# Patient Record
Sex: FEMALE | Race: BLACK OR AFRICAN AMERICAN | Employment: OTHER | URBAN - METROPOLITAN AREA
[De-identification: names, ages, dates, MRNs, and addresses within clinical notes are randomized per-mention and may not be internally consistent; named-entity substitution may affect disease eponyms.]

---

## 2020-03-21 ENCOUNTER — HOSPITAL ENCOUNTER (EMERGENCY)
Age: 80
Discharge: ANOTHER HOSPITAL | End: 2020-03-21
Attending: EMERGENCY MEDICINE
Payer: MEDICARE

## 2020-03-21 ENCOUNTER — APPOINTMENT (OUTPATIENT)
Dept: CT IMAGING | Age: 80
End: 2020-03-21
Payer: MEDICARE

## 2020-03-21 ENCOUNTER — APPOINTMENT (OUTPATIENT)
Dept: GENERAL RADIOLOGY | Age: 80
End: 2020-03-21
Payer: MEDICARE

## 2020-03-21 VITALS
DIASTOLIC BLOOD PRESSURE: 60 MMHG | WEIGHT: 178.8 LBS | SYSTOLIC BLOOD PRESSURE: 128 MMHG | HEART RATE: 120 BPM | OXYGEN SATURATION: 96 % | TEMPERATURE: 98.9 F | HEIGHT: 66 IN | BODY MASS INDEX: 28.73 KG/M2 | RESPIRATION RATE: 18 BRPM

## 2020-03-21 LAB
ANION GAP SERPL CALCULATED.3IONS-SCNC: 12 MMOL/L (ref 3–16)
BACTERIA: ABNORMAL /HPF
BASE EXCESS VENOUS: 5.1 MMOL/L (ref -2–3)
BASOPHILS ABSOLUTE: 0.1 K/UL (ref 0–0.2)
BASOPHILS RELATIVE PERCENT: 0.3 %
BILIRUBIN URINE: ABNORMAL
BLOOD, URINE: ABNORMAL
BUN BLDV-MCNC: 9 MG/DL (ref 7–20)
CALCIUM SERPL-MCNC: 9.9 MG/DL (ref 8.3–10.6)
CARBOXYHEMOGLOBIN: 2.6 % (ref 0–1.5)
CHLORIDE BLD-SCNC: 95 MMOL/L (ref 99–110)
CLARITY: CLEAR
CO2: 27 MMOL/L (ref 21–32)
COLOR: YELLOW
CREAT SERPL-MCNC: 1.2 MG/DL (ref 0.6–1.2)
EOSINOPHILS ABSOLUTE: 0.3 K/UL (ref 0–0.6)
EOSINOPHILS RELATIVE PERCENT: 1.1 %
EPITHELIAL CELLS, UA: ABNORMAL /HPF (ref 0–5)
GFR AFRICAN AMERICAN: 52
GFR NON-AFRICAN AMERICAN: 43
GLUCOSE BLD-MCNC: 232 MG/DL (ref 70–99)
GLUCOSE URINE: NEGATIVE MG/DL
HCO3 VENOUS: 29.6 MMOL/L (ref 24–28)
HCT VFR BLD CALC: 25.6 % (ref 36–48)
HEMOGLOBIN, VEN, REDUCED: 40.9 %
HEMOGLOBIN: 7.9 G/DL (ref 12–16)
KETONES, URINE: ABNORMAL MG/DL
LACTIC ACID: 1.2 MMOL/L (ref 0.4–2)
LEUKOCYTE ESTERASE, URINE: ABNORMAL
LYMPHOCYTES ABSOLUTE: 1.6 K/UL (ref 1–5.1)
LYMPHOCYTES RELATIVE PERCENT: 6.4 %
MCH RBC QN AUTO: 28.2 PG (ref 26–34)
MCHC RBC AUTO-ENTMCNC: 30.7 G/DL (ref 31–36)
MCV RBC AUTO: 92 FL (ref 80–100)
METHEMOGLOBIN VENOUS: 0.7 % (ref 0–1.5)
MICROSCOPIC EXAMINATION: YES
MONOCYTES ABSOLUTE: 1.3 K/UL (ref 0–1.3)
MONOCYTES RELATIVE PERCENT: 5.4 %
NEUTROPHILS ABSOLUTE: 21.1 K/UL (ref 1.7–7.7)
NEUTROPHILS RELATIVE PERCENT: 86.8 %
NITRITE, URINE: NEGATIVE
O2 SAT, VEN: 58 %
PCO2, VEN: 46.2 MMHG (ref 41–51)
PDW BLD-RTO: 21.1 % (ref 12.4–15.4)
PH UA: 6 (ref 5–8)
PH VENOUS: 7.42 (ref 7.35–7.45)
PLATELET # BLD: 73 K/UL (ref 135–450)
PLATELET SLIDE REVIEW: ADEQUATE
PMV BLD AUTO: 8.6 FL (ref 5–10.5)
PO2, VEN: 34.1 MMHG (ref 25–40)
POTASSIUM REFLEX MAGNESIUM: 4.1 MMOL/L (ref 3.5–5.1)
PRO-BNP: 3578 PG/ML (ref 0–449)
PROTEIN UA: >=300 MG/DL
RBC # BLD: 2.79 M/UL (ref 4–5.2)
RBC UA: ABNORMAL /HPF (ref 0–4)
SODIUM BLD-SCNC: 134 MMOL/L (ref 136–145)
SPECIFIC GRAVITY UA: 1.02 (ref 1–1.03)
TCO2 CALC VENOUS: 31 MMOL/L
TROPONIN: 0.46 NG/ML
TROPONIN: 0.51 NG/ML
URINE TYPE: ABNORMAL
UROBILINOGEN, URINE: 0.2 E.U./DL
WBC # BLD: 24.3 K/UL (ref 4–11)
WBC UA: ABNORMAL /HPF (ref 0–5)

## 2020-03-21 PROCEDURE — 96365 THER/PROPH/DIAG IV INF INIT: CPT

## 2020-03-21 PROCEDURE — 71250 CT THORAX DX C-: CPT

## 2020-03-21 PROCEDURE — 81001 URINALYSIS AUTO W/SCOPE: CPT

## 2020-03-21 PROCEDURE — 93005 ELECTROCARDIOGRAM TRACING: CPT | Performed by: EMERGENCY MEDICINE

## 2020-03-21 PROCEDURE — 99285 EMERGENCY DEPT VISIT HI MDM: CPT

## 2020-03-21 PROCEDURE — 80048 BASIC METABOLIC PNL TOTAL CA: CPT

## 2020-03-21 PROCEDURE — 85025 COMPLETE CBC W/AUTO DIFF WBC: CPT

## 2020-03-21 PROCEDURE — 6370000000 HC RX 637 (ALT 250 FOR IP): Performed by: PHYSICIAN ASSISTANT

## 2020-03-21 PROCEDURE — 83605 ASSAY OF LACTIC ACID: CPT

## 2020-03-21 PROCEDURE — 82803 BLOOD GASES ANY COMBINATION: CPT

## 2020-03-21 PROCEDURE — 2580000003 HC RX 258: Performed by: PHYSICIAN ASSISTANT

## 2020-03-21 PROCEDURE — 71045 X-RAY EXAM CHEST 1 VIEW: CPT

## 2020-03-21 PROCEDURE — 94761 N-INVAS EAR/PLS OXIMETRY MLT: CPT

## 2020-03-21 PROCEDURE — 6360000002 HC RX W HCPCS: Performed by: PHYSICIAN ASSISTANT

## 2020-03-21 PROCEDURE — 87040 BLOOD CULTURE FOR BACTERIA: CPT

## 2020-03-21 PROCEDURE — 84484 ASSAY OF TROPONIN QUANT: CPT

## 2020-03-21 PROCEDURE — 83880 ASSAY OF NATRIURETIC PEPTIDE: CPT

## 2020-03-21 PROCEDURE — 94664 DEMO&/EVAL PT USE INHALER: CPT

## 2020-03-21 PROCEDURE — 94640 AIRWAY INHALATION TREATMENT: CPT

## 2020-03-21 RX ORDER — SENNA AND DOCUSATE SODIUM 50; 8.6 MG/1; MG/1
2 TABLET, FILM COATED ORAL 2 TIMES DAILY PRN
COMMUNITY
End: 2020-03-26 | Stop reason: CLARIF

## 2020-03-21 RX ORDER — AMLODIPINE BESYLATE 5 MG/1
5 TABLET ORAL DAILY
Status: ON HOLD | COMMUNITY
End: 2020-04-03

## 2020-03-21 RX ORDER — 0.9 % SODIUM CHLORIDE 0.9 %
500 INTRAVENOUS SOLUTION INTRAVENOUS ONCE
Status: COMPLETED | OUTPATIENT
Start: 2020-03-21 | End: 2020-03-21

## 2020-03-21 RX ORDER — POLYETHYLENE GLYCOL 3350 17 G/17G
17 POWDER, FOR SOLUTION ORAL NIGHTLY
Status: ON HOLD | COMMUNITY
End: 2020-04-03

## 2020-03-21 RX ORDER — ESOMEPRAZOLE MAGNESIUM 20 MG/1
20 FOR SUSPENSION ORAL DAILY
COMMUNITY
End: 2020-03-26 | Stop reason: CLARIF

## 2020-03-21 RX ORDER — NYSTATIN 100000 [USP'U]/G
POWDER TOPICAL 2 TIMES DAILY
Status: ON HOLD | COMMUNITY
End: 2020-04-03

## 2020-03-21 RX ORDER — 0.9 % SODIUM CHLORIDE 0.9 %
500 INTRAVENOUS SOLUTION INTRAVENOUS ONCE
Status: DISCONTINUED | OUTPATIENT
Start: 2020-03-21 | End: 2020-03-21

## 2020-03-21 RX ORDER — ATORVASTATIN CALCIUM 80 MG/1
80 TABLET, FILM COATED ORAL NIGHTLY
Status: ON HOLD | COMMUNITY
End: 2020-04-03

## 2020-03-21 RX ORDER — LINEZOLID 2 MG/ML
600 INJECTION, SOLUTION INTRAVENOUS ONCE
Status: COMPLETED | OUTPATIENT
Start: 2020-03-21 | End: 2020-03-21

## 2020-03-21 RX ORDER — IPRATROPIUM BROMIDE AND ALBUTEROL SULFATE 2.5; .5 MG/3ML; MG/3ML
1 SOLUTION RESPIRATORY (INHALATION) ONCE
Status: COMPLETED | OUTPATIENT
Start: 2020-03-21 | End: 2020-03-21

## 2020-03-21 RX ADMIN — LINEZOLID 600 MG: 600 INJECTION, SOLUTION INTRAVENOUS at 15:37

## 2020-03-21 RX ADMIN — MEROPENEM 1 G: 1 INJECTION, POWDER, FOR SOLUTION INTRAVENOUS at 15:03

## 2020-03-21 RX ADMIN — IPRATROPIUM BROMIDE AND ALBUTEROL SULFATE 1 AMPULE: .5; 3 SOLUTION RESPIRATORY (INHALATION) at 13:27

## 2020-03-21 RX ADMIN — SODIUM CHLORIDE 500 ML: 9 INJECTION, SOLUTION INTRAVENOUS at 15:04

## 2020-03-21 ASSESSMENT — ENCOUNTER SYMPTOMS
EYE REDNESS: 0
COUGH: 1
DIARRHEA: 0
NAUSEA: 0
ABDOMINAL PAIN: 0
VOMITING: 0
SHORTNESS OF BREATH: 1

## 2020-03-21 NOTE — ED NOTES
Bed: A09-09  Expected date:   Expected time:   Means of arrival:   Comments:  Sulma Medrano RN  03/21/20 2104

## 2020-03-21 NOTE — ED NOTES
Spoke with Bakari Reed at this time. POA wants patient transferred to [facility] if she is to be admitted.  Will notify Nabil Palmer RN  03/21/20 3671

## 2020-03-21 NOTE — ED PROVIDER NOTES
(CHOLECALCIFEROL) 1000 UNIT TABS TABLET      Take 2,000 Units by mouth daily        Allergies     She is allergic to dye [iodides]; shellfish-derived products; and sulfa antibiotics. Physical Exam     INITIAL VITALS: BP: (!) 146/80,Temp: 98.9 °F (37.2 °C), Pulse: 127, Resp: (!) 32, SpO2: 100 %   Physical Exam  Vitals signs and nursing note reviewed. Constitutional:       General: She is not in acute distress. HENT:      Head: Normocephalic and atraumatic. Nose: Nose normal.      Mouth/Throat:      Mouth: Mucous membranes are moist.      Pharynx: Oropharynx is clear. Eyes:      Extraocular Movements: Extraocular movements intact. Conjunctiva/sclera: Conjunctivae normal.   Neck:      Musculoskeletal: Neck supple. Cardiovascular:      Rate and Rhythm: Normal rate and regular rhythm. Pulmonary:      Effort: Pulmonary effort is normal. No respiratory distress. Breath sounds: Normal breath sounds. No wheezing or rhonchi. Comments: Diminished breath sounds throughout  Abdominal:      General: Bowel sounds are normal. There is no distension. Palpations: Abdomen is soft. Tenderness: There is no abdominal tenderness. There is no guarding or rebound. Musculoskeletal:      Right lower leg: No edema. Left lower leg: No edema. Skin:     General: Skin is warm and dry. Neurological:      Mental Status: She is alert and oriented to person, place, and time.    Psychiatric:         Mood and Affect: Mood normal.         Behavior: Behavior normal.         Diagnostic Results     EKG   Interpreted in conjunction with emergencydepartment physician Kevin Hood MD  Rhythm: sinus tachycardia  Rate: tachycardia and 120-130  Axis: left  Ectopy: none  Conduction: nonspecific interventricular conduction block  ST Segments: nonspecific changes  T Waves:non specific changes  Q Waves: none  Clinical Impression: non-specific EKG  Comparison:  5/20/2009    RADIOLOGY:  CT CHEST ABDOMEN PELVIS WO w/ Reflex to MG   Result Value Ref Range    Sodium 134 (L) 136 - 145 mmol/L    Potassium reflex Magnesium 4.1 3.5 - 5.1 mmol/L    Chloride 95 (L) 99 - 110 mmol/L    CO2 27 21 - 32 mmol/L    Anion Gap 12 3 - 16    Glucose 232 (H) 70 - 99 mg/dL    BUN 9 7 - 20 mg/dL    CREATININE 1.2 0.6 - 1.2 mg/dL    GFR Non- 43 (A) >60    GFR  52 (A) >60    Calcium 9.9 8.3 - 10.6 mg/dL   Blood gas, venous (Lab)   Result Value Ref Range    pH, [place] 7.422 7.350 - 7.450    pCO2, [place] 46.2 41.0 - 51.0 mmHg    pO2, [place] 34.1 25.0 - 40.0 mmHg    HCO3, Venous 29.6 (H) 24.0 - 28.0 mmol/L    Base Excess, [place] 5.1 (H) -2.0 - 3.0 mmol/L    O2 Sat, [place] 58 Not established %    Carboxyhemoglobin 2.6 (H) 0.0 - 1.5 %    MetHgb, [place] 0.7 0.0 - 1.5 %    TC02 (Calc), [place] 31 mmol/L    Hemoglobin, [place], Reduced 40.90 %   Lactate, plasma   Result Value Ref Range    Lactic Acid 1.2 0.4 - 2.0 mmol/L       RECENT VITALS:  BP: (!) 116/53, Temp: 98.9 °F (37.2 °C), Pulse: 124,Resp: 24, SpO2: 94 %     ED Course     Nursing Notes, Past Medical Hx, Past Surgical Hx, Social Hx, Allergies, and Family Hx were reviewed. The patient was given the followingmedications:  Orders Placed This Encounter   Medications    ipratropium-albuterol (DUONEB) nebulizer solution 1 ampule    DISCONTD: 0.9 % sodium chloride bolus    meropenem (MERREM) 1 g in sodium chloride 0.9 % 100 mL IVPB (mini-bag)    0.9 % sodium chloride bolus    linezolid (ZYVOX) IVPB 600 mg       CONSULTS:  None    MEDICAL DECISION MAKING / ASSESSMENT / Katyethan Harris is a 78 y.o. female  with PMH of Diabetes, HTN, HLD, COPD/Asthma, CHF with EF of 60% in 2017, Aortic Stenosis s/p TAVR repair, ESRD on HD T/Th/Sat who presents with Shortness of Breath that began during dialysis this morning. Associated with wheezing. Patient also endorses a cough x 1 month. Denies any fever or chest pain, GI symptoms.  Chart review reveals that patient was admitted to [facility] discussed with patient's next of kin and power of  who requests that patient be [facility] to [facility].  Transfer was initiated and accepted by Dr. Marina Edouard. Patient will continue to be monitored until she is transferred to her new treatment location. This patient was also evaluated by the attending physician. All care plans were discussed and agreed upon. Clinical Impression     1.  Sepsis, due to unspecified organism, unspecified whether acute organ dysfunction present [facility])        Disposition      DISPOSITION  Transfer to [place], [place]  03/21/20 4059

## 2020-03-22 LAB
EKG ATRIAL RATE: 124 BPM
EKG DIAGNOSIS: NORMAL
EKG P AXIS: 63 DEGREES
EKG P-R INTERVAL: 150 MS
EKG Q-T INTERVAL: 344 MS
EKG QRS DURATION: 134 MS
EKG QTC CALCULATION (BAZETT): 494 MS
EKG R AXIS: 10 DEGREES
EKG T AXIS: 117 DEGREES
EKG VENTRICULAR RATE: 124 BPM

## 2020-03-25 LAB
BLOOD CULTURE, ROUTINE: NORMAL
CULTURE, BLOOD 2: NORMAL

## 2020-03-26 ENCOUNTER — APPOINTMENT (OUTPATIENT)
Dept: GENERAL RADIOLOGY | Age: 80
DRG: 871 | End: 2020-03-26
Payer: MEDICARE

## 2020-03-26 ENCOUNTER — APPOINTMENT (OUTPATIENT)
Dept: CT IMAGING | Age: 80
DRG: 871 | End: 2020-03-26
Payer: MEDICARE

## 2020-03-26 ENCOUNTER — HOSPITAL ENCOUNTER (INPATIENT)
Age: 80
LOS: 8 days | Discharge: HOSPICE | DRG: 871 | End: 2020-04-03
Attending: EMERGENCY MEDICINE | Admitting: INTERNAL MEDICINE
Payer: MEDICARE

## 2020-03-26 PROBLEM — A41.9 SEPSIS (HCC): Status: ACTIVE | Noted: 2020-03-26

## 2020-03-26 LAB
ABO/RH: NORMAL
ALBUMIN SERPL-MCNC: 3.5 G/DL (ref 3.4–5)
ALP BLD-CCNC: 92 U/L (ref 40–129)
ALT SERPL-CCNC: 8 U/L (ref 10–40)
ANION GAP SERPL CALCULATED.3IONS-SCNC: 17 MMOL/L (ref 3–16)
ANION GAP SERPL CALCULATED.3IONS-SCNC: 21 MMOL/L (ref 3–16)
ANISOCYTOSIS: ABNORMAL
ANTIBODY SCREEN: NORMAL
AST SERPL-CCNC: 20 U/L (ref 15–37)
BASE EXCESS ARTERIAL: 6 (ref -3–3)
BASE EXCESS VENOUS: 2.2 MMOL/L (ref -2–3)
BASOPHILS ABSOLUTE: 0.1 K/UL (ref 0–0.2)
BASOPHILS ABSOLUTE: 0.1 K/UL (ref 0–0.2)
BASOPHILS RELATIVE PERCENT: 0.6 %
BASOPHILS RELATIVE PERCENT: 0.6 %
BILIRUB SERPL-MCNC: 0.4 MG/DL (ref 0–1)
BILIRUBIN DIRECT: <0.2 MG/DL (ref 0–0.3)
BILIRUBIN, INDIRECT: ABNORMAL MG/DL (ref 0–1)
BUN BLDV-MCNC: 15 MG/DL (ref 7–20)
BUN BLDV-MCNC: 16 MG/DL (ref 7–20)
C-REACTIVE PROTEIN: 31.4 MG/L (ref 0–5.1)
CALCIUM IONIZED: 1.38 MMOL/L (ref 1.12–1.32)
CALCIUM SERPL-MCNC: 10.3 MG/DL (ref 8.3–10.6)
CALCIUM SERPL-MCNC: 10.4 MG/DL (ref 8.3–10.6)
CARBOXYHEMOGLOBIN: 2.8 % (ref 0–1.5)
CHLORIDE BLD-SCNC: 92 MMOL/L (ref 99–110)
CHLORIDE BLD-SCNC: 93 MMOL/L (ref 99–110)
CO2: 22 MMOL/L (ref 21–32)
CO2: 25 MMOL/L (ref 21–32)
CREAT SERPL-MCNC: 1.5 MG/DL (ref 0.6–1.2)
CREAT SERPL-MCNC: 1.6 MG/DL (ref 0.6–1.2)
EKG ATRIAL RATE: 140 BPM
EKG ATRIAL RATE: 154 BPM
EKG DIAGNOSIS: NORMAL
EKG DIAGNOSIS: NORMAL
EKG P AXIS: 66 DEGREES
EKG P-R INTERVAL: 148 MS
EKG Q-T INTERVAL: 262 MS
EKG Q-T INTERVAL: 386 MS
EKG QRS DURATION: 122 MS
EKG QRS DURATION: 128 MS
EKG QTC CALCULATION (BAZETT): 419 MS
EKG QTC CALCULATION (BAZETT): 589 MS
EKG R AXIS: 11 DEGREES
EKG R AXIS: 20 DEGREES
EKG T AXIS: 100 DEGREES
EKG T AXIS: 139 DEGREES
EKG VENTRICULAR RATE: 140 BPM
EKG VENTRICULAR RATE: 154 BPM
EOSINOPHILS ABSOLUTE: 0.3 K/UL (ref 0–0.6)
EOSINOPHILS ABSOLUTE: 0.5 K/UL (ref 0–0.6)
EOSINOPHILS RELATIVE PERCENT: 1.3 %
EOSINOPHILS RELATIVE PERCENT: 2.6 %
GFR AFRICAN AMERICAN: 37
GFR AFRICAN AMERICAN: 40
GFR NON-AFRICAN AMERICAN: 31
GFR NON-AFRICAN AMERICAN: 33
GLUCOSE BLD-MCNC: 171 MG/DL (ref 70–99)
GLUCOSE BLD-MCNC: 202 MG/DL (ref 70–99)
GLUCOSE BLD-MCNC: 219 MG/DL (ref 70–99)
GLUCOSE BLD-MCNC: 225 MG/DL (ref 70–99)
GLUCOSE BLD-MCNC: 244 MG/DL (ref 70–99)
GLUCOSE BLD-MCNC: 245 MG/DL (ref 70–99)
HCO3 ARTERIAL: 33 MMOL/L (ref 21–29)
HCO3 VENOUS: 29.6 MMOL/L (ref 24–28)
HCT VFR BLD CALC: 25.7 % (ref 36–48)
HCT VFR BLD CALC: 28.9 % (ref 36–48)
HEMOGLOBIN, VEN, REDUCED: 7.8 %
HEMOGLOBIN: 7.7 G/DL (ref 12–16)
HEMOGLOBIN: 8.6 G/DL (ref 12–16)
INR BLD: 1.07 (ref 0.86–1.14)
LACTATE: 2.05 MMOL/L (ref 0.4–2)
LACTIC ACID, SEPSIS: 0.9 MMOL/L (ref 0.4–1.9)
LACTIC ACID, SEPSIS: 1.5 MMOL/L (ref 0.4–1.9)
LACTIC ACID, SEPSIS: 2 MMOL/L (ref 0.4–1.9)
LIPASE: 35 U/L (ref 13–60)
LYMPHOCYTES ABSOLUTE: 2.9 K/UL (ref 1–5.1)
LYMPHOCYTES ABSOLUTE: 3.5 K/UL (ref 1–5.1)
LYMPHOCYTES RELATIVE PERCENT: 15.1 %
LYMPHOCYTES RELATIVE PERCENT: 16.7 %
MCH RBC QN AUTO: 27.7 PG (ref 26–34)
MCH RBC QN AUTO: 28 PG (ref 26–34)
MCHC RBC AUTO-ENTMCNC: 29.7 G/DL (ref 31–36)
MCHC RBC AUTO-ENTMCNC: 30.1 G/DL (ref 31–36)
MCV RBC AUTO: 93.1 FL (ref 80–100)
MCV RBC AUTO: 93.2 FL (ref 80–100)
METHEMOGLOBIN VENOUS: 0.2 % (ref 0–1.5)
MONOCYTES ABSOLUTE: 0.6 K/UL (ref 0–1.3)
MONOCYTES ABSOLUTE: 1.2 K/UL (ref 0–1.3)
MONOCYTES RELATIVE PERCENT: 2.8 %
MONOCYTES RELATIVE PERCENT: 6.2 %
NEUTROPHILS ABSOLUTE: 14.3 K/UL (ref 1.7–7.7)
NEUTROPHILS ABSOLUTE: 16.4 K/UL (ref 1.7–7.7)
NEUTROPHILS RELATIVE PERCENT: 75.5 %
NEUTROPHILS RELATIVE PERCENT: 78.6 %
O2 SAT, ARTERIAL: 96 % (ref 93–100)
O2 SAT, VEN: 92 %
PCO2 ARTERIAL: 74.4 MM HG (ref 35–45)
PCO2, VEN: 61.5 MMHG (ref 41–51)
PDW BLD-RTO: 21 % (ref 12.4–15.4)
PDW BLD-RTO: 22 % (ref 12.4–15.4)
PERFORMED ON: ABNORMAL
PH ARTERIAL: 7.25 (ref 7.35–7.45)
PH VENOUS: 7.3 (ref 7.35–7.45)
PLATELET # BLD: 30 K/UL (ref 135–450)
PLATELET # BLD: 43 K/UL (ref 135–450)
PLATELET SLIDE REVIEW: ABNORMAL
PMV BLD AUTO: 10 FL (ref 5–10.5)
PMV BLD AUTO: 9.9 FL (ref 5–10.5)
PO2 ARTERIAL: 98.9 MM HG (ref 75–108)
PO2, VEN: 73.8 MMHG (ref 25–40)
POC POTASSIUM: 4.6 MMOL/L (ref 3.5–5.1)
POC SAMPLE TYPE: ABNORMAL
POC SODIUM: 135 MMOL/L (ref 136–145)
POLYCHROMASIA: ABNORMAL
POTASSIUM REFLEX MAGNESIUM: 4.6 MMOL/L (ref 3.5–5.1)
POTASSIUM SERPL-SCNC: 4.7 MMOL/L (ref 3.5–5.1)
PRO-BNP: 4757 PG/ML (ref 0–449)
PROCALCITONIN: 1.31 NG/ML (ref 0–0.15)
PROTHROMBIN TIME: 12.4 SEC (ref 10–13.2)
RAPID INFLUENZA  B AGN: NEGATIVE
RAPID INFLUENZA A AGN: NEGATIVE
RBC # BLD: 2.76 M/UL (ref 4–5.2)
RBC # BLD: 3.11 M/UL (ref 4–5.2)
SCHISTOCYTES: ABNORMAL
SODIUM BLD-SCNC: 135 MMOL/L (ref 136–145)
SODIUM BLD-SCNC: 135 MMOL/L (ref 136–145)
TCO2 ARTERIAL: 35 MMOL/L
TCO2 CALC VENOUS: 32 MMOL/L
TEAR DROP CELLS: ABNORMAL
TOTAL PROTEIN: 7.1 G/DL (ref 6.4–8.2)
TROPONIN: 0.46 NG/ML
TROPONIN: 0.46 NG/ML
VANCOMYCIN RANDOM: 14 UG/ML
WBC # BLD: 18.9 K/UL (ref 4–11)
WBC # BLD: 20.8 K/UL (ref 4–11)

## 2020-03-26 PROCEDURE — 93005 ELECTROCARDIOGRAM TRACING: CPT | Performed by: EMERGENCY MEDICINE

## 2020-03-26 PROCEDURE — 85025 COMPLETE CBC W/AUTO DIFF WBC: CPT

## 2020-03-26 PROCEDURE — 6360000002 HC RX W HCPCS: Performed by: EMERGENCY MEDICINE

## 2020-03-26 PROCEDURE — 6360000002 HC RX W HCPCS: Performed by: STUDENT IN AN ORGANIZED HEALTH CARE EDUCATION/TRAINING PROGRAM

## 2020-03-26 PROCEDURE — 82330 ASSAY OF CALCIUM: CPT

## 2020-03-26 PROCEDURE — 2500000003 HC RX 250 WO HCPCS: Performed by: STUDENT IN AN ORGANIZED HEALTH CARE EDUCATION/TRAINING PROGRAM

## 2020-03-26 PROCEDURE — 5A1945Z RESPIRATORY VENTILATION, 24-96 CONSECUTIVE HOURS: ICD-10-PCS | Performed by: INTERNAL MEDICINE

## 2020-03-26 PROCEDURE — 84295 ASSAY OF SERUM SODIUM: CPT

## 2020-03-26 PROCEDURE — 86901 BLOOD TYPING SEROLOGIC RH(D): CPT

## 2020-03-26 PROCEDURE — 6370000000 HC RX 637 (ALT 250 FOR IP): Performed by: STUDENT IN AN ORGANIZED HEALTH CARE EDUCATION/TRAINING PROGRAM

## 2020-03-26 PROCEDURE — 2580000003 HC RX 258: Performed by: INTERNAL MEDICINE

## 2020-03-26 PROCEDURE — 86140 C-REACTIVE PROTEIN: CPT

## 2020-03-26 PROCEDURE — 80076 HEPATIC FUNCTION PANEL: CPT

## 2020-03-26 PROCEDURE — 94770 HC ETCO2 MONITOR DAILY: CPT

## 2020-03-26 PROCEDURE — 0BH17EZ INSERTION OF ENDOTRACHEAL AIRWAY INTO TRACHEA, VIA NATURAL OR ARTIFICIAL OPENING: ICD-10-PCS | Performed by: INTERNAL MEDICINE

## 2020-03-26 PROCEDURE — 2580000003 HC RX 258: Performed by: EMERGENCY MEDICINE

## 2020-03-26 PROCEDURE — 82947 ASSAY GLUCOSE BLOOD QUANT: CPT

## 2020-03-26 PROCEDURE — 84484 ASSAY OF TROPONIN QUANT: CPT

## 2020-03-26 PROCEDURE — 71250 CT THORAX DX C-: CPT

## 2020-03-26 PROCEDURE — 99291 CRITICAL CARE FIRST HOUR: CPT | Performed by: INTERNAL MEDICINE

## 2020-03-26 PROCEDURE — 83690 ASSAY OF LIPASE: CPT

## 2020-03-26 PROCEDURE — 86923 COMPATIBILITY TEST ELECTRIC: CPT

## 2020-03-26 PROCEDURE — 94761 N-INVAS EAR/PLS OXIMETRY MLT: CPT

## 2020-03-26 PROCEDURE — 2700000000 HC OXYGEN THERAPY PER DAY

## 2020-03-26 PROCEDURE — 96365 THER/PROPH/DIAG IV INF INIT: CPT

## 2020-03-26 PROCEDURE — 3E0G76Z INTRODUCTION OF NUTRITIONAL SUBSTANCE INTO UPPER GI, VIA NATURAL OR ARTIFICIAL OPENING: ICD-10-PCS | Performed by: INTERNAL MEDICINE

## 2020-03-26 PROCEDURE — 86900 BLOOD TYPING SEROLOGIC ABO: CPT

## 2020-03-26 PROCEDURE — 83605 ASSAY OF LACTIC ACID: CPT

## 2020-03-26 PROCEDURE — 71045 X-RAY EXAM CHEST 1 VIEW: CPT

## 2020-03-26 PROCEDURE — 2580000003 HC RX 258

## 2020-03-26 PROCEDURE — 99285 EMERGENCY DEPT VISIT HI MDM: CPT

## 2020-03-26 PROCEDURE — 82803 BLOOD GASES ANY COMBINATION: CPT

## 2020-03-26 PROCEDURE — 93005 ELECTROCARDIOGRAM TRACING: CPT | Performed by: STUDENT IN AN ORGANIZED HEALTH CARE EDUCATION/TRAINING PROGRAM

## 2020-03-26 PROCEDURE — 80202 ASSAY OF VANCOMYCIN: CPT

## 2020-03-26 PROCEDURE — 84132 ASSAY OF SERUM POTASSIUM: CPT

## 2020-03-26 PROCEDURE — 86850 RBC ANTIBODY SCREEN: CPT

## 2020-03-26 PROCEDURE — 84145 PROCALCITONIN (PCT): CPT

## 2020-03-26 PROCEDURE — 5A1D70Z PERFORMANCE OF URINARY FILTRATION, INTERMITTENT, LESS THAN 6 HOURS PER DAY: ICD-10-PCS | Performed by: INTERNAL MEDICINE

## 2020-03-26 PROCEDURE — 6360000002 HC RX W HCPCS: Performed by: INTERNAL MEDICINE

## 2020-03-26 PROCEDURE — 02HV33Z INSERTION OF INFUSION DEVICE INTO SUPERIOR VENA CAVA, PERCUTANEOUS APPROACH: ICD-10-PCS | Performed by: INTERNAL MEDICINE

## 2020-03-26 PROCEDURE — 30233N1 TRANSFUSION OF NONAUTOLOGOUS RED BLOOD CELLS INTO PERIPHERAL VEIN, PERCUTANEOUS APPROACH: ICD-10-PCS | Performed by: INTERNAL MEDICINE

## 2020-03-26 PROCEDURE — 85610 PROTHROMBIN TIME: CPT

## 2020-03-26 PROCEDURE — 80048 BASIC METABOLIC PNL TOTAL CA: CPT

## 2020-03-26 PROCEDURE — 83880 ASSAY OF NATRIURETIC PEPTIDE: CPT

## 2020-03-26 PROCEDURE — 87804 INFLUENZA ASSAY W/OPTIC: CPT

## 2020-03-26 PROCEDURE — 87040 BLOOD CULTURE FOR BACTERIA: CPT

## 2020-03-26 PROCEDURE — 90935 HEMODIALYSIS ONE EVALUATION: CPT

## 2020-03-26 PROCEDURE — P9016 RBC LEUKOCYTES REDUCED: HCPCS

## 2020-03-26 PROCEDURE — 2060000000 HC ICU INTERMEDIATE R&B

## 2020-03-26 PROCEDURE — 31500 INSERT EMERGENCY AIRWAY: CPT | Performed by: INTERNAL MEDICINE

## 2020-03-26 PROCEDURE — 94002 VENT MGMT INPAT INIT DAY: CPT

## 2020-03-26 PROCEDURE — 36415 COLL VENOUS BLD VENIPUNCTURE: CPT

## 2020-03-26 RX ORDER — PROPOFOL 10 MG/ML
10 INJECTION, EMULSION INTRAVENOUS
Status: DISCONTINUED | OUTPATIENT
Start: 2020-03-26 | End: 2020-03-30

## 2020-03-26 RX ORDER — POLYETHYLENE GLYCOL 3350 17 G/17G
17 POWDER, FOR SOLUTION ORAL NIGHTLY
Status: DISCONTINUED | OUTPATIENT
Start: 2020-03-26 | End: 2020-04-03 | Stop reason: HOSPADM

## 2020-03-26 RX ORDER — PROMETHAZINE HYDROCHLORIDE 25 MG/1
12.5 TABLET ORAL EVERY 6 HOURS PRN
Status: DISCONTINUED | OUTPATIENT
Start: 2020-03-26 | End: 2020-04-03 | Stop reason: HOSPADM

## 2020-03-26 RX ORDER — ALBUTEROL SULFATE 90 UG/1
2 AEROSOL, METERED RESPIRATORY (INHALATION) EVERY 6 HOURS PRN
Status: DISCONTINUED | OUTPATIENT
Start: 2020-03-26 | End: 2020-03-30 | Stop reason: CLARIF

## 2020-03-26 RX ORDER — IPRATROPIUM BROMIDE AND ALBUTEROL SULFATE 2.5; .5 MG/3ML; MG/3ML
1 SOLUTION RESPIRATORY (INHALATION) EVERY 4 HOURS PRN
Status: ON HOLD | COMMUNITY
End: 2020-04-03 | Stop reason: HOSPADM

## 2020-03-26 RX ORDER — HEPARIN SODIUM 5000 [USP'U]/ML
5000 INJECTION, SOLUTION INTRAVENOUS; SUBCUTANEOUS EVERY 8 HOURS SCHEDULED
Status: DISCONTINUED | OUTPATIENT
Start: 2020-03-26 | End: 2020-03-26

## 2020-03-26 RX ORDER — NICOTINE POLACRILEX 4 MG
15 LOZENGE BUCCAL PRN
Status: DISCONTINUED | OUTPATIENT
Start: 2020-03-26 | End: 2020-04-03 | Stop reason: HOSPADM

## 2020-03-26 RX ORDER — ATORVASTATIN CALCIUM 80 MG/1
80 TABLET, FILM COATED ORAL NIGHTLY
Status: DISCONTINUED | OUTPATIENT
Start: 2020-03-26 | End: 2020-04-03

## 2020-03-26 RX ORDER — ACETAMINOPHEN 325 MG/1
650 TABLET ORAL EVERY 6 HOURS PRN
Status: DISCONTINUED | OUTPATIENT
Start: 2020-03-26 | End: 2020-04-03 | Stop reason: HOSPADM

## 2020-03-26 RX ORDER — AMLODIPINE BESYLATE 5 MG/1
5 TABLET ORAL DAILY
Status: DISCONTINUED | OUTPATIENT
Start: 2020-03-26 | End: 2020-03-30

## 2020-03-26 RX ORDER — IPRATROPIUM BROMIDE AND ALBUTEROL SULFATE 2.5; .5 MG/3ML; MG/3ML
1 SOLUTION RESPIRATORY (INHALATION)
Status: DISCONTINUED | OUTPATIENT
Start: 2020-03-26 | End: 2020-03-26

## 2020-03-26 RX ORDER — PANTOPRAZOLE SODIUM 40 MG/1
40 TABLET, DELAYED RELEASE ORAL
Status: DISCONTINUED | OUTPATIENT
Start: 2020-03-27 | End: 2020-03-27

## 2020-03-26 RX ORDER — IPRATROPIUM BROMIDE AND ALBUTEROL SULFATE 2.5; .5 MG/3ML; MG/3ML
1 SOLUTION RESPIRATORY (INHALATION) EVERY 4 HOURS PRN
Status: DISCONTINUED | OUTPATIENT
Start: 2020-03-26 | End: 2020-03-26

## 2020-03-26 RX ORDER — SODIUM CHLORIDE 0.9 % (FLUSH) 0.9 %
10 SYRINGE (ML) INJECTION EVERY 12 HOURS SCHEDULED
Status: DISCONTINUED | OUTPATIENT
Start: 2020-03-26 | End: 2020-04-03 | Stop reason: HOSPADM

## 2020-03-26 RX ORDER — SODIUM CHLORIDE 0.9 % (FLUSH) 0.9 %
10 SYRINGE (ML) INJECTION PRN
Status: DISCONTINUED | OUTPATIENT
Start: 2020-03-26 | End: 2020-04-03 | Stop reason: HOSPADM

## 2020-03-26 RX ORDER — INSULIN GLARGINE 100 [IU]/ML
8 INJECTION, SOLUTION SUBCUTANEOUS NIGHTLY
Status: ON HOLD | COMMUNITY
End: 2020-04-03

## 2020-03-26 RX ORDER — CINACALCET 30 MG/1
30 TABLET, FILM COATED ORAL DAILY
Status: DISCONTINUED | OUTPATIENT
Start: 2020-03-26 | End: 2020-03-27

## 2020-03-26 RX ORDER — OMEPRAZOLE 20 MG/1
20 CAPSULE, DELAYED RELEASE ORAL DAILY
Status: ON HOLD | COMMUNITY
End: 2020-04-03

## 2020-03-26 RX ORDER — DEXTROSE MONOHYDRATE 50 MG/ML
100 INJECTION, SOLUTION INTRAVENOUS PRN
Status: DISCONTINUED | OUTPATIENT
Start: 2020-03-26 | End: 2020-04-03 | Stop reason: HOSPADM

## 2020-03-26 RX ORDER — DEXTROSE MONOHYDRATE 25 G/50ML
12.5 INJECTION, SOLUTION INTRAVENOUS PRN
Status: DISCONTINUED | OUTPATIENT
Start: 2020-03-26 | End: 2020-04-03 | Stop reason: HOSPADM

## 2020-03-26 RX ORDER — FERROUS SULFATE 325(65) MG
325 TABLET ORAL DAILY
Status: DISCONTINUED | OUTPATIENT
Start: 2020-03-26 | End: 2020-03-27

## 2020-03-26 RX ORDER — SODIUM CHLORIDE 9 MG/ML
INJECTION, SOLUTION INTRAVENOUS
Status: DISPENSED
Start: 2020-03-26 | End: 2020-03-27

## 2020-03-26 RX ORDER — SUCCINYLCHOLINE CHLORIDE 20 MG/ML
100 INJECTION INTRAMUSCULAR; INTRAVENOUS ONCE
Status: COMPLETED | OUTPATIENT
Start: 2020-03-26 | End: 2020-03-26

## 2020-03-26 RX ORDER — INSULIN LISPRO 100 [IU]/ML
0-12 INJECTION, SOLUTION INTRAVENOUS; SUBCUTANEOUS
Status: DISCONTINUED | OUTPATIENT
Start: 2020-03-26 | End: 2020-04-01

## 2020-03-26 RX ORDER — ETOMIDATE 2 MG/ML
0.3 INJECTION INTRAVENOUS ONCE
Status: COMPLETED | OUTPATIENT
Start: 2020-03-26 | End: 2020-03-26

## 2020-03-26 RX ORDER — HEPARIN SODIUM 1000 [USP'U]/ML
4500 INJECTION, SOLUTION INTRAVENOUS; SUBCUTANEOUS PRN
Status: DISCONTINUED | OUTPATIENT
Start: 2020-03-26 | End: 2020-04-03 | Stop reason: HOSPADM

## 2020-03-26 RX ORDER — CINACALCET 30 MG/1
30 TABLET, FILM COATED ORAL DAILY
Status: ON HOLD | COMMUNITY
End: 2020-04-03

## 2020-03-26 RX ORDER — ACETAMINOPHEN 650 MG/1
650 SUPPOSITORY RECTAL EVERY 6 HOURS PRN
Status: DISCONTINUED | OUTPATIENT
Start: 2020-03-26 | End: 2020-04-03 | Stop reason: HOSPADM

## 2020-03-26 RX ORDER — FERROUS SULFATE 325(65) MG
325 TABLET ORAL DAILY
Status: ON HOLD | COMMUNITY
End: 2020-04-03

## 2020-03-26 RX ORDER — ALBUTEROL SULFATE 2.5 MG/3ML
2.5 SOLUTION RESPIRATORY (INHALATION)
Status: DISCONTINUED | OUTPATIENT
Start: 2020-03-26 | End: 2020-03-26 | Stop reason: ALTCHOICE

## 2020-03-26 RX ORDER — ADENOSINE 3 MG/ML
6 INJECTION, SOLUTION INTRAVENOUS ONCE
Status: COMPLETED | OUTPATIENT
Start: 2020-03-26 | End: 2020-03-26

## 2020-03-26 RX ORDER — SODIUM CHLORIDE 9 MG/ML
INJECTION, SOLUTION INTRAVENOUS
Status: COMPLETED
Start: 2020-03-26 | End: 2020-03-26

## 2020-03-26 RX ORDER — 0.9 % SODIUM CHLORIDE 0.9 %
1000 INTRAVENOUS SOLUTION INTRAVENOUS ONCE
Status: COMPLETED | OUTPATIENT
Start: 2020-03-26 | End: 2020-03-26

## 2020-03-26 RX ORDER — ONDANSETRON 2 MG/ML
4 INJECTION INTRAMUSCULAR; INTRAVENOUS EVERY 6 HOURS PRN
Status: DISCONTINUED | OUTPATIENT
Start: 2020-03-26 | End: 2020-04-03 | Stop reason: HOSPADM

## 2020-03-26 RX ORDER — ALBUTEROL SULFATE 90 UG/1
2 AEROSOL, METERED RESPIRATORY (INHALATION)
Status: DISCONTINUED | OUTPATIENT
Start: 2020-03-26 | End: 2020-03-26

## 2020-03-26 RX ADMIN — PROPOFOL 10 MCG/KG/MIN: 10 INJECTION, EMULSION INTRAVENOUS at 19:13

## 2020-03-26 RX ADMIN — SODIUM CHLORIDE 1000 ML: 9 INJECTION, SOLUTION INTRAVENOUS at 10:30

## 2020-03-26 RX ADMIN — INSULIN LISPRO 2 UNITS: 100 INJECTION, SOLUTION INTRAVENOUS; SUBCUTANEOUS at 22:17

## 2020-03-26 RX ADMIN — HEPARIN SODIUM 5000 UNITS: 5000 INJECTION INTRAVENOUS; SUBCUTANEOUS at 21:32

## 2020-03-26 RX ADMIN — ADENOSINE 6 MG: 3 INJECTION, SOLUTION INTRAVENOUS at 19:05

## 2020-03-26 RX ADMIN — HEPARIN SODIUM 4500 UNITS: 1000 INJECTION INTRAVENOUS; SUBCUTANEOUS at 18:49

## 2020-03-26 RX ADMIN — MICONAZOLE NITRATE: 20 POWDER TOPICAL at 22:00

## 2020-03-26 RX ADMIN — ETOMIDATE 24.4 MG: 20 INJECTION, SOLUTION INTRAVENOUS at 19:00

## 2020-03-26 RX ADMIN — ATORVASTATIN CALCIUM 80 MG: 80 TABLET, FILM COATED ORAL at 21:01

## 2020-03-26 RX ADMIN — SUCCINYLCHOLINE CHLORIDE 70 MG: 20 INJECTION, SOLUTION INTRAMUSCULAR; INTRAVENOUS; PARENTERAL at 19:00

## 2020-03-26 RX ADMIN — VANCOMYCIN HYDROCHLORIDE 1000 MG: 10 INJECTION, POWDER, LYOPHILIZED, FOR SOLUTION INTRAVENOUS at 22:30

## 2020-03-26 RX ADMIN — Medication 1000 ML: at 10:30

## 2020-03-26 RX ADMIN — INSULIN GLARGINE 8 UNITS: 100 INJECTION, SOLUTION SUBCUTANEOUS at 22:16

## 2020-03-26 RX ADMIN — POLYETHYLENE GLYCOL (3350) 17 G: 17 POWDER, FOR SOLUTION ORAL at 21:30

## 2020-03-26 RX ADMIN — SODIUM CHLORIDE 500 ML: 9 INJECTION, SOLUTION INTRAVENOUS at 19:40

## 2020-03-26 RX ADMIN — MEROPENEM 500 MG: 500 INJECTION, POWDER, FOR SOLUTION INTRAVENOUS at 22:00

## 2020-03-26 RX ADMIN — CEFEPIME HYDROCHLORIDE 2 G: 2 INJECTION, POWDER, FOR SOLUTION INTRAVENOUS at 10:43

## 2020-03-26 ASSESSMENT — PAIN SCALES - GENERAL: PAINLEVEL_OUTOF10: 0

## 2020-03-26 ASSESSMENT — PULMONARY FUNCTION TESTS: PIF_VALUE: 30

## 2020-03-26 NOTE — H&P
evaluation treatment.    This patient has very complex and complicated medical problems in recent past. She has a lot of hospitalizations because of a variety of reasons. 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 She also has history of for C diff colitis.      Past Medical History:        Diagnosis Date    CAD (coronary artery disease)     CHF (congestive heart failure) (HCC)     CKD (chronic kidney disease)     COPD (chronic obstructive pulmonary disease) (HCC)     Hyperlipidemia     Hypertension     Type II or unspecified type diabetes mellitus without mention of complication, not stated as uncontrolled        Past Surgical History:        Procedure Laterality Date    BREAST SURGERY Right 10/1981    MASTECTOMY WITH IMPLANT    CHOLECYSTECTOMY      COLONOSCOPY      FOOT SURGERY Right     ORIF    HYSTERECTOMY         Medications Priorto Admission:    Medications Prior to Admission: ipratropium-albuterol (DUONEB) 0.5-2.5 (3) MG/3ML SOLN nebulizer solution, Inhale 1 vial into the lungs every 4 hours as needed for Shortness of Breath  cinacalcet (SENSIPAR) 30 MG tablet, Take 30 mg by mouth daily  insulin glargine (BASAGLAR KWIKPEN) 100 UNIT/ML injection pen, Inject 8 Units into the skin nightly  ferrous sulfate (IRON 325) 325 (65 Fe) MG tablet, Take 325 mg by mouth daily  insulin aspart (NOVOLOG) 100 UNIT/ML injection vial, Inject 0-10 Units into the skin every 8 hours Per sliding scale  omeprazole (PRILOSEC) 20 MG delayed release capsule, Take 20 mg by mouth daily  amLODIPine (NORVASC) 5 MG tablet, Take 5 mg by mouth daily  atorvastatin (LIPITOR) 80 MG tablet, Take 80 mg by mouth nightly   bisacodyl (DULCOLAX) 5 MG EC tablet, Take 5 mg by mouth daily as needed for Constipation  nystatin (MYCOSTATIN) 940124 UNIT/GM powder, Apply topically 2 times daily Apply topically to ABD folds  polyethylene glycol (GLYCOLAX) packet, Take 17 g by mouth nightly   aspirin 81 MG tablet, Take 81 mg by mouth daily  albuterol (PROVENTIL HFA;VENTOLIN HFA) 108 (90 BASE) MCG/ACT inhaler, Inhale 2 puffs into the lungs every 6 hours as needed. Allergies:  Dye [iodides]; Glimepiride; Shellfish-derived products; Sulfa antibiotics; and Sulfacetamide    Social History:   · TOBACCO:   reports that she has never smoked. She has never used smokeless tobacco.  · ETOH:   reports no history of alcohol use. · DRUGS : denies  · Patient currently lives in a nursing home  ·   Family History:       Problem Relation Age of Onset    Asthma Mother          ROS: A 10 point review of systems was conducted, significant findings as noted in HPI. Physical Exam  HENT:      Head: Normocephalic and atraumatic. Nose: Nose normal.      Mouth/Throat:      Mouth: Mucous membranes are moist.   Eyes:      Extraocular Movements: Extraocular movements intact. Pupils: Pupils are equal, round, and reactive to light. Neck:      Musculoskeletal: Normal range of motion. Cardiovascular:      Rate and Rhythm: Tachycardia present. Pulses: Normal pulses. Heart sounds: Normal heart sounds. Pulmonary:      Breath sounds: Rales present. Abdominal:      General: Bowel sounds are normal.      Palpations: Abdomen is soft. Musculoskeletal: Normal range of motion. Skin:     General: Skin is warm. Capillary Refill: Capillary refill takes less than 2 seconds. Coloration: Skin is not jaundiced. Findings: No bruising. Neurological:      General: No focal deficit present. Mental Status: She is alert and oriented to person, place, and time. Mental status is at baseline.       Cranial Nerves: No cranial Echo 8/26/2016  Normal left ventricle size with mild concentric left ventricular hypertrophy. Normal systolic function with an estimated ejection fraction of 60-65%. No regional wall motion abnormalities are seen. Diastolic filling parameters suggests grade I diastolic dysfunction. Mitral annular calcification is present. The maximum mitral valve pressure gradient is 18 mmHg and the mean pressure gradient is 8 mmHg with max velocity of 2.1 m/sec. Trace mitral and tricuspid regurgitation. The aortic valve is thickened/calcified with decreased leaflet mobility. The aortic valve area is calculated at .7 cm2 with a max velocity of 4.3 m/sec, maximum pressure gradient of 75 mmHg and a mean pressure gradient of 43 mmHg. This is c/w severe aortic stenosis. Trace aortic regurgitation. ASSESSMENT AND PLAN:  Sepsis 2/2 Post-Obstructive Pneumonia c/b Probable superimposed Viral Pneumonia  - CT does show occlusion of RML bronchus with collapse of RML. - Peripheral GGO c/w infectious or inflammatory causes, including COVID  - Hx of aspirations, so will cover with Meropenem, Vancomycin  - MRSA nasal probe  - Diatherix viral pneumonia panel  - Flu negative  - trend lactate    ESRD on HD Tu, Th, Sat  - Follows with [facility], consulted for HD   - daily RFP    Chronic Diastolic Heart Failure  - Will remove fluid with HD  - BNP 4700    Troponemia 2/2 sepsis and/or demand ischemia and/or ESRD  - Trop 0.46  - in setting of ESRD will trend  - no complaint of CP    IDDM  - Lantus 8u HS  - med dose SSI  - POC Glucose AC/HS  - Hypoglycemia protocol  - last HbA1c 5.5, 12/2019    From chart review reported stage II pressure ulcer on the coccyx  We will get nursing to do a full skin review     Chronic Problems:  CAD (s/p cardiac cath without stent placement 10/7/2016):  Chronic Normocytic Anemia: Likely ACD. At baseline near 8. Severe Malnutrition POA: Due to poor oral intake from ongoing medical process. Has PEG in place.  TFs ordered while inpatient. Stage 2 Decubitus Ulcer POA: Consult Wound Care for dressing changes.   Hx CDiff colitis:    Code Status:Full code  FEN: NPO for now  PPX: SQ  DISPO: FINA Smart MD  3/26/2020,  2:26 PM

## 2020-03-26 NOTE — CONSULTS
Pulmonary Consult    Patient's PCP: Lynn GU  Referred by: Geovanni Staley MD for subacute RML obstruction with RML collapse      HISTORY OF PRESENT ILLNESS:    This is a  78 y.o. female with PMH of CHF and others as listed below, was admitted to the hospital on 3/11 through 3/20 for PEG tube malfunction and pyelonephritis. She was readmitted to the [place] [id number] on 3/21 through 3/23 and was treated for RLL pneumonia. She was readmitted last night for SOB. At the time of evaluation this afternoon while she was in PCU, she was, alert, breathing was not labored. She was on 3 liters of O2. She was confused. She is not sure why she is in the hospital.  She was getting ready to get HD. Apparently shortly after starting HD she had an episode of HTN, tachycardia, and was labored, using accessory muscles. She was transferred to the ICU and was intubated. Past Medical / Surgical History:    Past Medical History:   Diagnosis Date    CAD (coronary artery disease)     [facility]est[facility])     CKD (chronic kidney disease)     COPD (chronic obstructive pulmonary disease) ([facility])     Hyperlipidemia     Hypertension     Type II or unspecified type diabetes mellitus without mention of complication, not stated as uncontrolled      Past Surgical History:   Procedure Laterality Date    BREAST SURGERY Right 10/1981    MASTECTOMY WITH IMPLANT    CHOLECYSTECTOMY      COLONOSCOPY      FOOT SURGERY Right     ORIF    HYSTERECTOMY         Medications Prior to Admission:    No current facility-administered medications on file prior to encounter.       Current Outpatient Medications on File Prior to Encounter   Medication Sig Dispense Refill    ipratropium-albuterol (DUONEB) 0.5-2.5 (3) MG/3ML SOLN nebulizer solution Inhale 1 vial into the lungs every 4 hours as needed for Shortness of Breath      cinacalcet (SENSIPAR) 30 MG tablet Take 30 mg by mouth daily      insulin glargine (BASAGLAR Heart sounds: No murmur. Pulmonary:      Effort: Pulmonary effort is normal. No respiratory distress. Breath sounds: No stridor. Rales present. No wheezing. Abdominal:      General: Bowel sounds are normal.      Palpations: Abdomen is soft. Musculoskeletal:         General: No deformity. Skin:     General: Skin is warm and dry. Neurological:      Mental Status: She is alert. Psychiatric:         Behavior: Behavior normal.         LABS:  Recent Labs     03/26/20  1032   WBC 18.9*   HGB 8.6*   HCT 28.9*   PLT 43*                                                                  Recent Labs     03/26/20  1032   *   K 4.6   CL 93*   CO2 25   BUN 15   CREATININE 1.5*   GLUCOSE 245*     Recent Labs     03/26/20  1032   AST 20   ALT 8*   BILITOT 0.4   ALKPHOS 92     Recent Labs     03/26/20  1032   TROPONINI 0.46*     No results for input(s): BNP in the last 72 hours. No results found for: PHART, SZW4UGD, PO2ART  Recent Labs     03/26/20  1032   INR 1.07     @LABRCNT(NITRITE,COLORU,PHUR,LABCAST,WBCUA,RBCUA,MUCUS,TRICHOMONAS,YEAST,BACTERIA,CLARITYU,SPECGRAV,LEUKOCYTESUR,UROBILINOGEN,BILIRUBINUR,BLOODU,GLUCOSEU,KETONESU,AMORPHOUS)@     DATA:  CT chest   Occlusion of the right middle lobe bronchus with associated collapse of the right middle lobe.     Interval increase in bilateral nodular and groundglass opacities. This is nonspecific, however, may reflect infectious or inflammatory etiologies, particularly given the increased from the prior study. Areas of septal thickening are also noted, with    which can be seen in the setting of edema.       Moderate bilateral pleural effusions. CXR  Dense multifocal consolidation involving the right lower lung and left mid/lower lung. Asymmetric pulmonary edema and multifocal pneumonia are considerations.       Top normal heart size without change.       Right jugular dialysis catheter in standard position.       Right axillary surgical clips noted. Assessment &Plan:    Patient Active Problem List:     Other and unspecified hyperlipidemia     Essential hypertension, benign     Coronary atherosclerosis of native coronary artery     Aortic valve disorder     Congestive heart failure (HCC)     Asthma     Sleep apnea     Sepsis ([facility]      Acute respiratory failure requiring intubation and mechanical vent support. RML collapse. She has recurrent episodes of hypoxia and hypercarbia. Suspect mucus plugging. Bilateral ground glass and nodular opacities consistent with the admission diagnosis of pneumonia with superimposed volume overload. Bilateral pleural effusions. LLL atelectasis   Tachycardia - improved with sedation   ESRD On HD  COVID rule out  ESRD on HD  Leukocytosis   Thrombocytopenia, not new but it is much lower than baseline    Continue vent support   Vanc, zosyn and azithromycin  Plan for bronch  D/c heparin and monitor platelets. 4T score 3    Pt has a high probability of imminent or life-threatening deterioration requiring close monitoring, and highly complex decision-making and/or interventions of high intensity to assess, manipulate, and support his critical organ systems to prevent a likely inevitable decline which could occur if left untreated.      A total critical care time 55 minutes was used. This includes but not limited to examining patient, collaborating with other physicians, monitoring vital signs, telemetry, continuous pulse oximetry, and clinical response to IV medications, documentation time, review and interpretation of laboratory and radiological data, review of nursing notes and old record review. This time excludes any time that may have been spent performing procedures for life threatening organ failure. Thank you Dr. Perry Kelley for the opportunity to be involved in this patients care.  If you have any questions or concerns please feel free to contact me  Full Code

## 2020-03-26 NOTE — ED NOTES
Home Med List is complete. Source of medications in list is [address] [place] [address] home. [phone]. Spoke with RN, and she verbally read medications over the phone to me. RN states the patient had NO meds today prior to leaving for HD. RN also states that the patient can take all PO meds by mouth, crushed. PEG tube is reserved for nocturnal TF. Also spoke with RN at [facility] [place]. RN states that the patient did receive vancomycin and cefepime does on Tues March 24. RN states that those doses completed the current orders. Vancomycin random level ordered for now. Spoke with Dr. Rickey White; if this level returns less than 19-20 mcg/mL, will re-dose today.        Lucretia KuD., BCPS   3/26/2020 11:28 AM  Wireless: [phone]

## 2020-03-26 NOTE — ED NOTES
Bed: A04-04  Expected date:   Expected time:   Means of arrival:   Comments:  Elvin Crain RN  03/26/20 9765

## 2020-03-26 NOTE — FLOWSHEET NOTE
Treatment time: 0.5 hours  Net UF: -324 ml ( fluid balance positive)    Pre weight: 81.5 kg   Post weight: SEDRICK kg  EDW: 78 kg    Access used: right chest wall TDC  Access function: Good with  ml/min    Medications or blood products given: no    Regular outpatient schedule: T.T.S    Summary of response to treatment: Pt tolerated poor on HD, Pt's on HD for about 15 minutes, her HR increasing to  120s-130s, and start c/o SOB, reduced UF goal, and keep monitoring. Pt's HR remained 130s, and PSO2:93-95% for 5 minutes, then HR increased to 140s, saline bolus given, noticed Dr Erica Cabral, changed to UF only. But Pt's HR increased to 140-150s, Primary nurse called rapid respond. Terminated HD. Pt's on non-breather and transferred to ICU. Noticed Dr Erica Cabral and charge nurse. Cirt Line: Initial Hct: 21.4;   Copy of dialysis treatment record placed in chart, to be scanned into EMR.     03/26/20 1717 03/26/20 1833   Vital Signs   /75 (!) 201/106   Temp 97.4 °F (36.3 °C)  --    Pulse 107 150   Resp 16 28   SpO2 100 % 93 %   Weight 179 lb 10.8 oz (81.5 kg)  --    Weight Method Actual;Bed scale  (1 pillow, 1 sheet, 1 pad and 1 small banket)  --    Percent Weight Change 4.49  --    Post-Hemodialysis Assessment   Post-Treatment Procedures  --  Blood returned;Catheter capped, clamped and heparinized x 2 ports   Machine Disinfection Process  --  Acid/Vinegar Clean;Bleach; Exterior Machine Disinfection   Rinseback Volume (ml)  --  400 ml   Total Liters Processed (l/min)  --  11.8 l/min   Dialyzer Clearance  --  Clear   Duration of Treatment (minutes)  --  37 minutes   Heparin amount administered during treatment (units)  --  0 units   Hemodialysis Intake (ml)  --  800 ml   Hemodialysis Output (ml)  --  476 ml   NET Removed (ml)  --  -324 ml   Tolerated Treatment  --  Poor   Edema Generalized Generalized   Edema Generalized +1;Non-pitting +1;Non-pitting

## 2020-03-26 NOTE — PROGRESS NOTES
Went to do EKG while patient was in PCU. Nurses told me respiratory had completed EKG prior to me arriving. EKG is not showing in MUSE system with completed EKG's. Respiratory unaware of EKG needing to be done when asked. Patient is now in ICU. Will perform EKG and document.

## 2020-03-26 NOTE — PROGRESS NOTES
4 Eyes Admission Assessment     I agree as the admission nurse that 2 RN's have performed a thorough Head to Toe Skin Assessment on the patient. ALL assessment sites listed below have been assessed on admission. Areas assessed by both nurses: yes  [x]   Head, Face, and Ears   [x]   Shoulders, Back, and Chest  [x]   Arms, Elbows, and Hands   [x]   Coccyx, Sacrum, and Ischum  [x]   Legs, Feet, and Heels        Does the Patient have Skin Breakdown? Yes a wound was noted on the Admission Assessment and an LDA was Initiated documentation include the Sintia-wound, Wound Assessment, Measurements, Dressing Treatment, Drainage, and Color\",  Excoriation to groin, buttocks, and under abdominal fold. Full thickness wound to buttocks.          Atilio Prevention initiated:  Yes   Wound Care Orders initiated:  Yes      [address]  nurse consulted for Pressure Injury (Stage 3,4, Unstageable, DTI, NWPT, and Complex wounds):  Yes      Nurse 1 eSignature: Electronically signed by Adriana Vanegas RN on 3/26/20 at 2:58 PM EDT    **SHARE this note so that the co-signing nurse is able to place an eSignature**    Nurse 2 eSignature: {Esignature:[id number]}

## 2020-03-26 NOTE — PROGRESS NOTES
0    Vital Signs   BP (!) 144/76   Pulse 111   Temp 98 °F (36.7 °C) (Oral)   Resp 15   Ht 5' 4\" (1.626 m)   Wt 171 lb 15.3 oz (78 kg)   SpO2 100%   BMI 29.52 kg/m²   SPO2 (COPD values may differ): 88-89% on room air or greater than 92% on FiO2 28- 35% = 2    Peak Flow (asthma only): not applicable = 0    RSI: 5-6 = Q4hr PRN (every four hours as needed) for dyspnea        Plan       Goals: Medication delivery    Patient/caregiver was educated on the proper method of use for Respiratory Care Devices:  No: N/A      Level of patient/caregiver understanding able to:   ? Verbalize understanding   ? Demonstrate understanding       ? Teach back        ? Needs reinforcement       x  No available caregiver               ? Other:     Response to education:  N/A     Is patient being placed on Home Treatment Regimen? Yes     Does the patient have everything they need prior to discharge? NA     Comments: Chart reviewed    Plan of Care: Albuterol and Albuterol/Ipratropium Bromide HHN    Electronically signed by Bertha Aguilar RCP on 3/26/2020 at 6:17 PM    Respiratory Protocol Guidelines     1. Assessment and treatment by Respiratory Therapy will be initiated for medication and therapeutic interventions upon initiation of aerosolized medication. 2. Physician will be contacted for respiratory rate (RR) greater than 35 breaths per minute. Therapy will be held for heart rate (HR) greater than 140 beats per minute, pending direction from physician. 3. Bronchodilators will be administered via Metered Dose Inhaler (MDI) with spacer when the following criteria are met:  a. Alert and cooperative     b. HR < 140 bpm  c. RR < 30 bpm                d. Can demonstrate a 2-3 second inspiratory hold  4. Bronchodilators will be administered via Hand Held Nebulizer [facility]) to patients when ANY of the following criteria are met  a. Incognizant or uncooperative          b. Patients treated with HHN at Home        c.  Unable to

## 2020-03-26 NOTE — PROGRESS NOTES
Pt transferred to room 4319 from the ED. Vital signs obtained. . Other vital signs stable. No complaints of SOB at this time. Oxygen saturation 100% on 4 liters nasal cannula. Pt currently A&O x 4. Pt oriented to room and call light. Call light and belongings within reach. Bed alarm set. Tele placed. Will monitor.  Electronically signed by Mayda Desir RN on 3/26/2020 at 1:13 PM

## 2020-03-26 NOTE — ED PROVIDER NOTES
jugular dialysis catheter in standard position. Right axillary surgical clips noted.           LABS:   Results for orders placed or performed during the hospital encounter of 03/26/20   CBC Auto Differential   Result Value Ref Range    WBC 18.9 (H) 4.0 - 11.0 K/uL    RBC 3.11 (L) 4.00 - 5.20 M/uL    Hemoglobin 8.6 (L) 12.0 - 16.0 g/dL    Hematocrit 28.9 (L) 36.0 - 48.0 %    MCV 93.1 80.0 - 100.0 fL    MCH 27.7 26.0 - 34.0 pg    MCHC 29.7 (L) 31.0 - 36.0 g/dL    RDW 22.0 (H) 12.4 - 15.4 %    Platelets 43 (L) 896 - 450 K/uL    MPV 9.9 5.0 - 10.5 fL    PLATELET SLIDE REVIEW Decreased     Neutrophils % 75.5 %    Lymphocytes % 15.1 %    Monocytes % 6.2 %    Eosinophils % 2.6 %    Basophils % 0.6 %    Neutrophils Absolute 14.3 (H) 1.7 - 7.7 K/uL    Lymphocytes Absolute 2.9 1.0 - 5.1 K/uL    Monocytes Absolute 1.2 0.0 - 1.3 K/uL    Eosinophils Absolute 0.5 0.0 - 0.6 K/uL    Basophils Absolute 0.1 0.0 - 0.2 K/uL    Anisocytosis 1+ (A)     Polychromasia Occasional (A)     Schistocytes 1+ (A)     Tear Drop Cells 1+ (A)    Basic Metabolic Panel w/ Reflex to MG   Result Value Ref Range    Sodium 135 (L) 136 - 145 mmol/L    Potassium reflex Magnesium 4.6 3.5 - 5.1 mmol/L    Chloride 93 (L) 99 - 110 mmol/L    CO2 25 21 - 32 mmol/L    Anion Gap 17 (H) 3 - 16    Glucose 245 (H) 70 - 99 mg/dL    BUN 15 7 - 20 mg/dL    CREATININE 1.5 (H) 0.6 - 1.2 mg/dL    GFR Non- 33 (A) >60    GFR  40 (A) >60    Calcium 10.4 8.3 - 10.6 mg/dL   Hepatic Function Panel   Result Value Ref Range    Total Protein 7.1 6.4 - 8.2 g/dL    Alb 3.5 3.4 - 5.0 g/dL    Alkaline Phosphatase 92 40 - 129 U/L    ALT 8 (L) 10 - 40 U/L    AST 20 15 - 37 U/L    Total Bilirubin 0.4 0.0 - 1.0 mg/dL    Bilirubin, Direct <0.2 0.0 - 0.3 mg/dL    Bilirubin, Indirect see below 0.0 - 1.0 mg/dL   Lipase   Result Value Ref Range    Lipase 35.0 13.0 - 60.0 U/L   Troponin   Result Value Ref Range    Troponin 0.46 (H) <0.01 ng/mL   Brain

## 2020-03-27 LAB
ALBUMIN SERPL-MCNC: 2.9 G/DL (ref 3.4–5)
ANION GAP SERPL CALCULATED.3IONS-SCNC: 12 MMOL/L (ref 3–16)
ANISOCYTOSIS: ABNORMAL
BASOPHILS ABSOLUTE: 0.1 K/UL (ref 0–0.2)
BASOPHILS RELATIVE PERCENT: 1 %
BUN BLDV-MCNC: 19 MG/DL (ref 7–20)
CALCIUM SERPL-MCNC: 10.2 MG/DL (ref 8.3–10.6)
CHLORIDE BLD-SCNC: 96 MMOL/L (ref 99–110)
CO2: 26 MMOL/L (ref 21–32)
CREAT SERPL-MCNC: 1.8 MG/DL (ref 0.6–1.2)
EKG ATRIAL RATE: 112 BPM
EKG DIAGNOSIS: NORMAL
EKG P AXIS: 57 DEGREES
EKG P-R INTERVAL: 158 MS
EKG Q-T INTERVAL: 368 MS
EKG QRS DURATION: 130 MS
EKG QTC CALCULATION (BAZETT): 502 MS
EKG R AXIS: 46 DEGREES
EKG T AXIS: 185 DEGREES
EKG VENTRICULAR RATE: 112 BPM
EOSINOPHILS ABSOLUTE: 0.5 K/UL (ref 0–0.6)
EOSINOPHILS RELATIVE PERCENT: 5 %
GFR AFRICAN AMERICAN: 33
GFR NON-AFRICAN AMERICAN: 27
GLUCOSE BLD-MCNC: 100 MG/DL (ref 70–99)
GLUCOSE BLD-MCNC: 119 MG/DL (ref 70–99)
GLUCOSE BLD-MCNC: 94 MG/DL (ref 70–99)
GLUCOSE BLD-MCNC: 95 MG/DL (ref 70–99)
GLUCOSE BLD-MCNC: 95 MG/DL (ref 70–99)
GLUCOSE BLD-MCNC: 99 MG/DL (ref 70–99)
HCT VFR BLD CALC: 22.3 % (ref 36–48)
HEMOGLOBIN: 7 G/DL (ref 12–16)
HYPOCHROMIA: ABNORMAL
LYMPHOCYTES ABSOLUTE: 2.5 K/UL (ref 1–5.1)
LYMPHOCYTES RELATIVE PERCENT: 24 %
Lab: NORMAL
MCH RBC QN AUTO: 29 PG (ref 26–34)
MCHC RBC AUTO-ENTMCNC: 31.5 G/DL (ref 31–36)
MCV RBC AUTO: 92 FL (ref 80–100)
MONOCYTES ABSOLUTE: 0.6 K/UL (ref 0–1.3)
MONOCYTES RELATIVE PERCENT: 6 %
MRSA SCREEN RT-PCR: NORMAL
NEUTROPHILS ABSOLUTE: 6.6 K/UL (ref 1.7–7.7)
NEUTROPHILS RELATIVE PERCENT: 64 %
PARATHYROID HORMONE INTACT: 79.3 PG/ML (ref 14–72)
PDW BLD-RTO: 21.2 % (ref 12.4–15.4)
PERFORMED ON: ABNORMAL
PERFORMED ON: NORMAL
PHOSPHORUS: 3 MG/DL (ref 2.5–4.9)
PLATELET # BLD: 33 K/UL (ref 135–450)
PLATELET SLIDE REVIEW: ABNORMAL
PMV BLD AUTO: 10.4 FL (ref 5–10.5)
POTASSIUM SERPL-SCNC: 3.7 MMOL/L (ref 3.5–5.1)
RBC # BLD: 2.42 M/UL (ref 4–5.2)
REPORT: NORMAL
REPORT: NORMAL
RESPIRATORY PANEL PCR: NORMAL
SARS-COV-2: NOT DETECTED
SODIUM BLD-SCNC: 134 MMOL/L (ref 136–145)
THIS TEST SENT TO: NORMAL
VITAMIN D 25-HYDROXY: 20 NG/ML
WBC # BLD: 10.3 K/UL (ref 4–11)

## 2020-03-27 PROCEDURE — 6370000000 HC RX 637 (ALT 250 FOR IP): Performed by: STUDENT IN AN ORGANIZED HEALTH CARE EDUCATION/TRAINING PROGRAM

## 2020-03-27 PROCEDURE — 80069 RENAL FUNCTION PANEL: CPT

## 2020-03-27 PROCEDURE — 6360000002 HC RX W HCPCS: Performed by: INTERNAL MEDICINE

## 2020-03-27 PROCEDURE — 2580000003 HC RX 258: Performed by: STUDENT IN AN ORGANIZED HEALTH CARE EDUCATION/TRAINING PROGRAM

## 2020-03-27 PROCEDURE — 82306 VITAMIN D 25 HYDROXY: CPT

## 2020-03-27 PROCEDURE — 6370000000 HC RX 637 (ALT 250 FOR IP): Performed by: INTERNAL MEDICINE

## 2020-03-27 PROCEDURE — 94770 HC ETCO2 MONITOR DAILY: CPT

## 2020-03-27 PROCEDURE — 93010 ELECTROCARDIOGRAM REPORT: CPT | Performed by: INTERNAL MEDICINE

## 2020-03-27 PROCEDURE — 2700000000 HC OXYGEN THERAPY PER DAY

## 2020-03-27 PROCEDURE — 2580000003 HC RX 258: Performed by: INTERNAL MEDICINE

## 2020-03-27 PROCEDURE — 36415 COLL VENOUS BLD VENIPUNCTURE: CPT

## 2020-03-27 PROCEDURE — 2000000000 HC ICU R&B

## 2020-03-27 PROCEDURE — 99221 1ST HOSP IP/OBS SF/LOW 40: CPT | Performed by: NURSE PRACTITIONER

## 2020-03-27 PROCEDURE — C9113 INJ PANTOPRAZOLE SODIUM, VIA: HCPCS | Performed by: INTERNAL MEDICINE

## 2020-03-27 PROCEDURE — 94761 N-INVAS EAR/PLS OXIMETRY MLT: CPT

## 2020-03-27 PROCEDURE — 6360000002 HC RX W HCPCS: Performed by: STUDENT IN AN ORGANIZED HEALTH CARE EDUCATION/TRAINING PROGRAM

## 2020-03-27 PROCEDURE — 94750 HC PULMONARY COMPLIANCE STUDY: CPT

## 2020-03-27 PROCEDURE — 94003 VENT MGMT INPAT SUBQ DAY: CPT

## 2020-03-27 PROCEDURE — 85025 COMPLETE CBC W/AUTO DIFF WBC: CPT

## 2020-03-27 PROCEDURE — 89220 SPUTUM SPECIMEN COLLECTION: CPT

## 2020-03-27 PROCEDURE — 83970 ASSAY OF PARATHORMONE: CPT

## 2020-03-27 PROCEDURE — 0100U HC RESPIRPTHGN MULT REV TRANS & AMP PRB TECH 21 TRGT: CPT

## 2020-03-27 PROCEDURE — 87641 MR-STAPH DNA AMP PROBE: CPT

## 2020-03-27 PROCEDURE — 99291 CRITICAL CARE FIRST HOUR: CPT | Performed by: INTERNAL MEDICINE

## 2020-03-27 RX ORDER — 0.9 % SODIUM CHLORIDE 0.9 %
500 INTRAVENOUS SOLUTION INTRAVENOUS ONCE
Status: COMPLETED | OUTPATIENT
Start: 2020-03-26 | End: 2020-03-26

## 2020-03-27 RX ORDER — CASTOR OIL AND BALSAM, PERU 788; 87 MG/G; MG/G
OINTMENT TOPICAL 2 TIMES DAILY
Status: DISCONTINUED | OUTPATIENT
Start: 2020-03-27 | End: 2020-04-03 | Stop reason: HOSPADM

## 2020-03-27 RX ORDER — SODIUM CHLORIDE 9 MG/ML
INJECTION, SOLUTION INTRAVENOUS
Status: DISPENSED
Start: 2020-03-27 | End: 2020-03-28

## 2020-03-27 RX ORDER — PANTOPRAZOLE SODIUM 40 MG/10ML
40 INJECTION, POWDER, LYOPHILIZED, FOR SOLUTION INTRAVENOUS DAILY
Status: DISCONTINUED | OUTPATIENT
Start: 2020-03-27 | End: 2020-04-03

## 2020-03-27 RX ORDER — 0.9 % SODIUM CHLORIDE 0.9 %
250 INTRAVENOUS SOLUTION INTRAVENOUS ONCE
Status: DISCONTINUED | OUTPATIENT
Start: 2020-03-27 | End: 2020-03-27

## 2020-03-27 RX ADMIN — MICONAZOLE NITRATE: 20 POWDER TOPICAL at 22:00

## 2020-03-27 RX ADMIN — PROPOFOL 18 MCG/KG/MIN: 10 INJECTION, EMULSION INTRAVENOUS at 16:19

## 2020-03-27 RX ADMIN — Medication: at 22:00

## 2020-03-27 RX ADMIN — INSULIN GLARGINE 8 UNITS: 100 INJECTION, SOLUTION SUBCUTANEOUS at 22:05

## 2020-03-27 RX ADMIN — ATORVASTATIN CALCIUM 80 MG: 80 TABLET, FILM COATED ORAL at 22:05

## 2020-03-27 RX ADMIN — PROPOFOL 18 MCG/KG/MIN: 10 INJECTION, EMULSION INTRAVENOUS at 04:19

## 2020-03-27 RX ADMIN — MEROPENEM 500 MG: 500 INJECTION, POWDER, FOR SOLUTION INTRAVENOUS at 22:18

## 2020-03-27 RX ADMIN — MICONAZOLE NITRATE: 20 POWDER TOPICAL at 09:52

## 2020-03-27 RX ADMIN — AZITHROMYCIN MONOHYDRATE 500 MG: 500 INJECTION, POWDER, LYOPHILIZED, FOR SOLUTION INTRAVENOUS at 16:19

## 2020-03-27 RX ADMIN — PANTOPRAZOLE SODIUM 40 MG: 40 INJECTION, POWDER, FOR SOLUTION INTRAVENOUS at 12:11

## 2020-03-27 ASSESSMENT — PULMONARY FUNCTION TESTS
PIF_VALUE: 28
PIF_VALUE: 27
PIF_VALUE: 25
PIF_VALUE: 27
PIF_VALUE: 26
PIF_VALUE: 27
PIF_VALUE: 28
PIF_VALUE: 28
PIF_VALUE: 25
PIF_VALUE: 25
PIF_VALUE: 27
PIF_VALUE: 28
PIF_VALUE: 27
PIF_VALUE: 24
PIF_VALUE: 24
PIF_VALUE: 28
PIF_VALUE: 25

## 2020-03-27 ASSESSMENT — PAIN SCALES - GENERAL
PAINLEVEL_OUTOF10: 0
PAINLEVEL_OUTOF10: 0

## 2020-03-27 NOTE — FLOWSHEET NOTE
Pt received 6 unit adenosine for sustained SVT converted to sinus tach  pvcs in bigeminy an order for 12-lead EKG already in place Dr Berna Arias is aware, BP 62/41 500 ml normal saline started by Dr Bhatia's order, RASS  -2 propofol titrated from 20 to 10 mcg/kg/min.

## 2020-03-27 NOTE — PROGRESS NOTES
status and stayed in ICU due to hypoxia. She was treated with vancomycin and meropenem for pyelonephritis. She was discharged a week ago. She went for dialysis today and 1 hour into dialysis became extremely short of breath. She was sent to the ER for further evaluation. In the ER she was found to have leukocytosis with a troponin of 0.51. CT chest showed occlusion of right middle lobe bronchus due to mucous plugging and associated collapse. Lactic acid is 1.5. Rapid flu test was negative. Interval History:     Blood pressures well controlled. She is in isolation    ROS:     Seen with-no family in the room    positives in bold   Not obtained                  All other remaining systems are negative or unable to obtain        PMH/PSH/SH/Family History:     Past Medical History:   Diagnosis Date    CAD (coronary artery disease)     CH[facility]congestive heart failure) ([facility])     CKD (chronic kidney disease)     COPD [facility]chronic obstructive pulmonary disease) [facility] [address]     Hyperlipidemia     Hypertension     Type II or unspecified type diabetes mellitus without mention of complication, not stated as uncontrolled        Past Surgical History:   Procedure Laterality Date    BREAST SURGERY Right 10/1981    MASTECTOMY WITH IMPLANT    CHOLECYSTECTOMY      COLONOSCOPY      FOOT SURGERY Right     ORIF    HYSTERECTOMY          reports that she has never smoked. She has never used smokeless tobacco. She reports that she does not drink alcohol or use drugs. family history includes Asthma in her mother.          Medication:     Current Facility-Administered Medications: vancomycin (VANCOCIN) intermittent dosing (placeholder), , Other, See Admin Instructions  [Held by provider] amLODIPine (NORVASC) tablet 5 mg, 5 mg, Oral, Daily  atorvastatin (LIPITOR) tablet 80 mg, 80 mg, Oral, Nightly  bisacodyl (DULCOLAX) EC tablet 5 mg, 5 mg, Oral, Daily PRN  cinacalcet (SENSIPAR) tablet 30 mg, 30 mg, Oral, Daily  ferrous

## 2020-03-27 NOTE — PROGRESS NOTES
Unable to visualize pt's skin issue, MAS/IAD partial thickness opening to gluteal crease explained by Carol Newell as well as moist groin and abdominal creases. Recommend venelex for sacrum and Interdry to creases. See orders. Bia Charter verbalizing understanding/agreement with all.

## 2020-03-27 NOTE — CONSULTS
[facility]  Palliative Medicine Consultation Note      Date Of Admission:3/26/2020  Date of consult: 03/27/20  Seen by [facility] in the past:  No    Recommendations:        Pt is alert, appears comfortable, following commands for the nurse. Called son Jessica Mejia and introduced palliative care. He says he's talked with palliative care at [facility] before. He says his mother hasn't been home since June 2019 and has been back and forth between [address] [place] [address] and [facility], had a trach several months ago. Jessica Mejia isn't sure about his mother's wishes or her feelings about her trach, PEG, dialysis, code status or overall quality of life. He thinks she's just glad to be alive. He said she does not have advance directives, so he and his brother Reba Oconnell ([phone]) would be his next of kin. Reviewed palliative care notes from [facility], which indicate pt does have HCPOA. It appears that pt did indicate in 12/2019 that she wanted aggressive care including dialysis to prolong her life as long as possible. Called [place] [id number] medical records and d/w Tyrone BENAVIDEZ to obtain HCPOA. 1. Goals of Care/Advanced Care planning/Code status: Full code, discussed with son Jessica Mejia today. Jessica Mejia wants to continue aggressive care in hopes that his mother will get better and eventually be able to return home. 2. Pain: pt appears comfortable overall. No signs of distress on current sedation. 3. SOB: currently intubated, CT chest showed occlusion of RML bronchus likely due to mucous plugging with associated atelectatic collapse and small to moderate pleural effusion. Had a trach several months ago.   4. Disposition: to be determined    Reason for Consult:         __x___ Goals of Care  __x___ Code Status Discussion/Advanced Care Planning   _____ Psychosocial/Family Support  _____ Symptom Management  _____ Other Threasa Colace)    Requesting Physician: Dr. Jose G Alonso: SOB    History Obtained From:  electronic medical record    History of Present Illness:         Ms Azucena Jensen is an 78year old female with PMH of CAD, CHF, ESRD on HD, COPD, HLD, HTN, DM, PEG, chronic respiratory failure s/p trach who presented to the ED with shortness of breath. She had been discharged from ED 1 day prior with the same complaint. She was recently admitted (3/11/2020-3/20/2020) for treatment of pyelonephritis and was treated with vancomycin and Merrem. She was found with WBC 24K and CT chest showed occlusion of RML bronchus likely due to mucous plugging with associated atelectatic collapse and small to moderate pleural effusion. There was concern for sepsis/lung abscess or post obstructive PNA. Shortly after starting HD she had an episode of HTN, tachycardia, and was labored, using accessory muscles. She was transferred to the ICU and was intubated.      Subjective:                     Past Medical History:        Diagnosis Date    CAD (coronary artery disease)     CHF (congestive heart failure) (HCC)     CKD (chronic kidney disease)     COPD (chronic obstructive pulmonary disease) (HCC)     Hyperlipidemia     Hypertension     Type II or unspecified type diabetes mellitus without mention of complication, not stated as uncontrolled        Past Surgical History:        Procedure Laterality Date    BREAST SURGERY Right 10/1981    MASTECTOMY WITH IMPLANT    CHOLECYSTECTOMY      COLONOSCOPY      FOOT SURGERY Right     ORIF    HYSTERECTOMY         Current Medications:    Current Facility-Administered Medications: vancomycin (VANCOCIN) intermittent dosing (placeholder), , Other, See Admin Instructions  [Held by provider] amLODIPine (NORVASC) tablet 5 mg, 5 mg, Oral, Daily  atorvastatin (LIPITOR) tablet 80 mg, 80 mg, Oral, Nightly  bisacodyl (DULCOLAX) EC tablet 5 mg, 5 mg, Oral, Daily PRN  cinacalcet (SENSIPAR) tablet 30 mg, 30 mg, Oral, Daily  ferrous sulfate (IRON 325) tablet 325 mg, 325 mg, Oral, Daily  miconazole (MICOTIN) 2 % powder, , Topical, BID  polyethylene glycol

## 2020-03-27 NOTE — FLOWSHEET NOTE
Pt transferred from 4319 to ICU with acute respiratory distress PCO2 74, pt received 30 mg etomidate & 70 mg succinylcholine was intubated with ETT 7.5 current vent setting AC/PRVC rate 16  FIO2 70% & PEEP +5 propofol at 20 mcg/kg/min, pt open eyes to voice follow commands & moves all extremities.

## 2020-03-27 NOTE — PROGRESS NOTES
ICU Progress Note    Admit Date: 3/26/2020  Day: 2  Vent Day: None  IV Access:Peripheral  IV Fluids:None  Vasopressors:None                Antibiotics: None  Diet: DIET CARB CONTROL;    CC: SOB    Interval history: This is a COVID rule out patient. No events reported overnight.  Please see attending attestation for further interval history        Medications:     Scheduled Meds:   vancomycin (VANCOCIN) intermittent dosing (placeholder)   Other See Admin Instructions    amLODIPine  5 mg Oral Daily    atorvastatin  80 mg Oral Nightly    cinacalcet  30 mg Oral Daily    ferrous sulfate  325 mg Oral Daily    miconazole   Topical BID    polyethylene glycol  17 g Oral Nightly    pantoprazole  40 mg Oral QAM AC    sodium chloride flush  10 mL Intravenous 2 times per day    azithromycin  500 mg Intravenous Q24H    insulin glargine  8 Units Subcutaneous Nightly    insulin lispro  0-12 Units Subcutaneous TID WC    meropenem  500 mg Intravenous Q24H    [START ON 4/2/2020] darbepoetin mateo-polysorbate  60 mcg Intravenous Weekly - Thursday     Continuous Infusions:   dextrose      propofol 18 mcg/kg/min (03/27/20 041)     PRN Meds:bisacodyl, sodium chloride flush, acetaminophen **OR** acetaminophen, promethazine **OR** ondansetron, glucose, dextrose, glucagon (rDNA), dextrose, albuterol sulfate HFA, heparin (porcine)    Objective:   Vitals:   T-max:  Patient Vitals for the past 8 hrs:   BP Temp Temp src Pulse Resp SpO2 Height   03/27/20 0606 (!) 94/48 -- -- 87 17 -- --   03/27/20 0600 (!) 71/43 -- -- 86 22 -- --   03/27/20 0500 (!) 89/51 -- -- 85 21 -- --   03/27/20 0455 -- -- -- 89 16 100 % 5' 4\" (1.626 m)   03/27/20 0400 (!) 101/59 99.5 °F (37.5 °C) Axillary 97 20 -- --   03/27/20 0330 -- -- -- 97 20 100 % --   03/27/20 0315 -- -- -- 92 17 100 % --   03/27/20 0300 (!) 89/57 -- -- 89 25 100 % --   03/27/20 0245 -- -- -- 85 21 100 % --   03/27/20 0230 -- -- -- 97 20 100 % --   03/27/20 0215 -- -- -- 98 20 100 % -- to IV medications, documentation time, review and interpretation of laboratory and radiological data, review of nursing notes and old record review. This time excludes any time that may have been spent performing procedures for life threatening organ failure.     Chrystine Boxer MD

## 2020-03-27 NOTE — PROGRESS NOTES
03/27/20 0035   Cough/Sputum   $Obtained Sample $Induced Sputum  (Molecular panel sample obtained and walked to lab. )

## 2020-03-27 NOTE — CONSULTS
NUTRITION ASSESSMENT  Admission Date: 3/26/2020     Type and Reason for Visit: Initial    NUTRITION RECOMMENDATIONS:     ENTERAL NUTRITION  1. Recommend order \"Diet: Tube feed continuous/ NPO\". Initiate formula Glucerna 1.2 at 20 mL/hr and as tolerated, increase by 20 mL/hr q 4 hours until goal of 40 mL/hr is met per PEG. 2. Suggest 100 mL H20 q 4 hours for 100% fluid recommendations when no IVF. 3. Recommend adding 1 bottle Proteinex P2Go protein supplement. Flush tube w/ 30-60 mL H20 before and after administration. 4. Monitor for tolerance (bowel habits, N/V, cramping, abdominal distention). NUTRITION ASSESSMENT:  Consult for tube feed ordering and management. Patient currently intubated and sedated on propofol. Pt with ESRD on HD and intubated last night with worsening respiratory status. Will provide EN recommendations and continue to monitor nutritional status. MALNUTRITION ASSESSMENT  Context: Acute illness or injury   Malnutrition Status:  At risk for malnutrition  Findings of the 6 clinical characteristics of malnutrition (Minimum of 2 out of 6 clinical characteristics is required to make the diagnosis of moderate or severe Protein Calorie Malnutrition based on AND/ASPEN Guidelines):   Energy Intake %: Less than or equal to 50% of estimated energy requirement   Energy Intake Time: Unable to assess(x1d)   Body Fat Status: Unable to assess   Muscle Mass Status: Unable to assess   Fluid Accumulation Status: No significant fluid accumulation   Reduced  Strength: Not measured    NUTRITION DIAGNOSIS  Problem: Inadequate Oral Intake  Etiology: Acute or chronic injury or trauma  Signs & Symptoms: NPO status due to medical condition    NUTRITION INTERVENTION  Food and/or Nutrient Delivery:Continue NPO or Start Tube Feeding   Nutrition education/counseling/coordination of care: Continue Inpatient Monitoring     NUTRITION MONITORING & EVALUATION:  Evaluation:Goals set   Goals: Patient will tolerate EN to meet 100% of nutrition needs  Monitoring: Nutrition Progression      OBJECTIVE DATA:  · Nutrition-Focused Physical Findings: No BM  · Wounds None      Past Medical History:   Diagnosis Date    CAD (coronary artery disease)     [facility]     CKD (chronic kidney disease)     COPD [facility]chronic obstructive pulmonary disease) [facility]     Hyperlipidemia     Hypertension     Type II or unspecified type diabetes mellitus without mention of complication, not stated as uncontrolled         ANTHROPOMETRICS  Current Height: 5' 4\" (162.6 cm)  Current Weight: 179 lb 10.8 oz (81.5 kg)    Admission weight: 171 lb 15.3 oz (78 kg)  Ideal Bodyweight 120 lb (54 lb)      BMI BMI (Calculated): 30.9    Wt Readings from Last 50 Encounters:   03/26/20 179 lb 10.8 oz (81.5 kg)   03/21/20 178 lb 12.8 oz (81.1 kg)   09/13/16 208 lb (94.3 kg)   08/25/16 210 lb (95.3 kg)   11/19/15 203 lb (92.1 kg)   06/02/15 199 lb (90.3 kg)   11/18/14 202 lb (91.6 kg)   03/24/14 202 lb (91.6 kg)   09/04/13 212 lb 3.2 oz (96.3 kg)   02/11/13 236 lb (107 kg)   08/31/12 241 lb (109.3 kg)   03/26/12 246 lb (111.6 kg)   09/12/11 226 lb (102.5 kg)       COMPARATIVE STANDARDS  Estimated Total Kcals/Day : 15-18 Current Bodyweight (81 kg) 6647-0429 kcal    Estimated Total Protein (g/day) : 1.3-1.5 Ideal Bodyweight  (54 kg) 70-81 g/day  Estimated Daily Total Fluid (ml/day): 1500 mL per day     Food / Nutrition-Related History  Pre-Admission / Home Diet:      Home Supplements / Herbals:    none noted  Food Restrictions / Cultural Requests:    none noted    Diet Orders / Intake / Nutrition Support  Current diet/supplement order: DIET CARB CONTROL;     NSG Recorded PO:   PO Fluids P.O.: 0 mL  PO Meals PO Meals Eaten (%): 0   PO Intake: NPO  PO Supplement: NPO   PO Supplement Intake: NPO    Tube Feeding Goal: Glucerna 1.2 @ 40 mL/hr plus one proteinex 2 go provides 960 mL TV, 0690 kca (1488 with propofol)l, 83 grams protein, 772 mL

## 2020-03-28 PROBLEM — Z03.818 ENCOUNTER FOR OBSERVATION FOR SUSPECTED EXPOSURE TO OTHER BIOLOGICAL AGENTS RULED OUT: Status: ACTIVE | Noted: 2020-03-28

## 2020-03-28 PROBLEM — Z99.2 ESRD ON HEMODIALYSIS (HCC): Status: ACTIVE | Noted: 2020-03-28

## 2020-03-28 PROBLEM — N18.6 ESRD ON HEMODIALYSIS (HCC): Status: ACTIVE | Noted: 2020-03-28

## 2020-03-28 LAB
ALBUMIN SERPL-MCNC: 2.9 G/DL (ref 3.4–5)
ANION GAP SERPL CALCULATED.3IONS-SCNC: 13 MMOL/L (ref 3–16)
BASE EXCESS ARTERIAL: 3.5 MMOL/L (ref -3–3)
BASOPHILS ABSOLUTE: 0.1 K/UL (ref 0–0.2)
BASOPHILS RELATIVE PERCENT: 0.9 %
BUN BLDV-MCNC: 23 MG/DL (ref 7–20)
CALCIUM SERPL-MCNC: 10.1 MG/DL (ref 8.3–10.6)
CARBOXYHEMOGLOBIN ARTERIAL: 3.7 % (ref 0–1.5)
CHLORIDE BLD-SCNC: 96 MMOL/L (ref 99–110)
CO2: 24 MMOL/L (ref 21–32)
CREAT SERPL-MCNC: 2.1 MG/DL (ref 0.6–1.2)
EOSINOPHILS ABSOLUTE: 0.4 K/UL (ref 0–0.6)
EOSINOPHILS RELATIVE PERCENT: 4.3 %
GFR AFRICAN AMERICAN: 27
GFR NON-AFRICAN AMERICAN: 23
GLUCOSE BLD-MCNC: 135 MG/DL (ref 70–99)
GLUCOSE BLD-MCNC: 208 MG/DL (ref 70–99)
GLUCOSE BLD-MCNC: 219 MG/DL (ref 70–99)
HCO3 ARTERIAL: 27 MMOL/L (ref 21–29)
HCT VFR BLD CALC: 23.5 % (ref 36–48)
HEMOGLOBIN, ART, EXTENDED: 6.8 G/DL
HEMOGLOBIN: 7.3 G/DL (ref 12–16)
LYMPHOCYTES ABSOLUTE: 1.2 K/UL (ref 1–5.1)
LYMPHOCYTES RELATIVE PERCENT: 14.2 %
MCH RBC QN AUTO: 28.7 PG (ref 26–34)
MCHC RBC AUTO-ENTMCNC: 30.9 G/DL (ref 31–36)
MCV RBC AUTO: 92.8 FL (ref 80–100)
METHEMOGLOBIN ARTERIAL: 0.7 % (ref 0–1.4)
MONOCYTES ABSOLUTE: 0.8 K/UL (ref 0–1.3)
MONOCYTES RELATIVE PERCENT: 9.7 %
NEUTROPHILS ABSOLUTE: 5.9 K/UL (ref 1.7–7.7)
NEUTROPHILS RELATIVE PERCENT: 70.9 %
O2 SAT, ARTERIAL: 100 % (ref 93–100)
PCO2 ARTERIAL: 38.4 MMHG (ref 35–45)
PDW BLD-RTO: 21.9 % (ref 12.4–15.4)
PERFORMED ON: ABNORMAL
PERFORMED ON: ABNORMAL
PH ARTERIAL: 7.46 (ref 7.35–7.45)
PHOSPHORUS: 3.5 MG/DL (ref 2.5–4.9)
PLATELET # BLD: 53 K/UL (ref 135–450)
PMV BLD AUTO: 10.5 FL (ref 5–10.5)
PO2 ARTERIAL: 124 MMHG (ref 75–108)
POTASSIUM SERPL-SCNC: 4 MMOL/L (ref 3.5–5.1)
RBC # BLD: 2.53 M/UL (ref 4–5.2)
SODIUM BLD-SCNC: 133 MMOL/L (ref 136–145)
TCO2 ARTERIAL: 28 MMOL/L
VANCOMYCIN RANDOM: 19.9 UG/ML
WBC # BLD: 8.3 K/UL (ref 4–11)

## 2020-03-28 PROCEDURE — 2580000003 HC RX 258: Performed by: STUDENT IN AN ORGANIZED HEALTH CARE EDUCATION/TRAINING PROGRAM

## 2020-03-28 PROCEDURE — 6360000002 HC RX W HCPCS: Performed by: STUDENT IN AN ORGANIZED HEALTH CARE EDUCATION/TRAINING PROGRAM

## 2020-03-28 PROCEDURE — 94761 N-INVAS EAR/PLS OXIMETRY MLT: CPT

## 2020-03-28 PROCEDURE — 0BJ08ZZ INSPECTION OF TRACHEOBRONCHIAL TREE, VIA NATURAL OR ARTIFICIAL OPENING ENDOSCOPIC: ICD-10-PCS | Performed by: INTERNAL MEDICINE

## 2020-03-28 PROCEDURE — 6360000002 HC RX W HCPCS: Performed by: INTERNAL MEDICINE

## 2020-03-28 PROCEDURE — 99152 MOD SED SAME PHYS/QHP 5/>YRS: CPT | Performed by: INTERNAL MEDICINE

## 2020-03-28 PROCEDURE — 6370000000 HC RX 637 (ALT 250 FOR IP): Performed by: STUDENT IN AN ORGANIZED HEALTH CARE EDUCATION/TRAINING PROGRAM

## 2020-03-28 PROCEDURE — 85025 COMPLETE CBC W/AUTO DIFF WBC: CPT

## 2020-03-28 PROCEDURE — 80202 ASSAY OF VANCOMYCIN: CPT

## 2020-03-28 PROCEDURE — 94799 UNLISTED PULMONARY SVC/PX: CPT

## 2020-03-28 PROCEDURE — 2700000000 HC OXYGEN THERAPY PER DAY

## 2020-03-28 PROCEDURE — 80069 RENAL FUNCTION PANEL: CPT

## 2020-03-28 PROCEDURE — 2580000003 HC RX 258: Performed by: INTERNAL MEDICINE

## 2020-03-28 PROCEDURE — 82803 BLOOD GASES ANY COMBINATION: CPT

## 2020-03-28 PROCEDURE — 6360000002 HC RX W HCPCS

## 2020-03-28 PROCEDURE — C9113 INJ PANTOPRAZOLE SODIUM, VIA: HCPCS | Performed by: INTERNAL MEDICINE

## 2020-03-28 PROCEDURE — 2000000000 HC ICU R&B

## 2020-03-28 PROCEDURE — 94770 HC ETCO2 MONITOR DAILY: CPT

## 2020-03-28 PROCEDURE — 36415 COLL VENOUS BLD VENIPUNCTURE: CPT

## 2020-03-28 PROCEDURE — 31622 DX BRONCHOSCOPE/WASH: CPT | Performed by: INTERNAL MEDICINE

## 2020-03-28 PROCEDURE — 99291 CRITICAL CARE FIRST HOUR: CPT | Performed by: INTERNAL MEDICINE

## 2020-03-28 PROCEDURE — 94003 VENT MGMT INPAT SUBQ DAY: CPT

## 2020-03-28 PROCEDURE — 6370000000 HC RX 637 (ALT 250 FOR IP): Performed by: INTERNAL MEDICINE

## 2020-03-28 PROCEDURE — 90935 HEMODIALYSIS ONE EVALUATION: CPT

## 2020-03-28 PROCEDURE — 94750 HC PULMONARY COMPLIANCE STUDY: CPT

## 2020-03-28 RX ORDER — PROPOFOL 10 MG/ML
INJECTION, EMULSION INTRAVENOUS
Status: COMPLETED
Start: 2020-03-28 | End: 2020-03-28

## 2020-03-28 RX ORDER — CHLORHEXIDINE GLUCONATE 0.12 MG/ML
15 RINSE ORAL 2 TIMES DAILY
Status: DISCONTINUED | OUTPATIENT
Start: 2020-03-28 | End: 2020-04-03 | Stop reason: HOSPADM

## 2020-03-28 RX ADMIN — Medication: at 08:41

## 2020-03-28 RX ADMIN — INSULIN LISPRO 4 UNITS: 100 INJECTION, SOLUTION INTRAVENOUS; SUBCUTANEOUS at 16:15

## 2020-03-28 RX ADMIN — HEPARIN SODIUM 4500 UNITS: 1000 INJECTION INTRAVENOUS; SUBCUTANEOUS at 08:39

## 2020-03-28 RX ADMIN — Medication 10 ML: at 08:41

## 2020-03-28 RX ADMIN — PROPOFOL 50 MG: 10 INJECTION, EMULSION INTRAVENOUS at 15:32

## 2020-03-28 RX ADMIN — CHLORHEXIDINE GLUCONATE 0.12% ORAL RINSE 15 ML: 1.2 LIQUID ORAL at 08:43

## 2020-03-28 RX ADMIN — ATORVASTATIN CALCIUM 80 MG: 80 TABLET, FILM COATED ORAL at 20:10

## 2020-03-28 RX ADMIN — MEROPENEM 500 MG: 500 INJECTION, POWDER, FOR SOLUTION INTRAVENOUS at 20:10

## 2020-03-28 RX ADMIN — Medication 10 ML: at 20:12

## 2020-03-28 RX ADMIN — VANCOMYCIN HYDROCHLORIDE 500 MG: 10 INJECTION, POWDER, LYOPHILIZED, FOR SOLUTION INTRAVENOUS at 15:07

## 2020-03-28 RX ADMIN — INSULIN LISPRO 4 UNITS: 100 INJECTION, SOLUTION INTRAVENOUS; SUBCUTANEOUS at 11:07

## 2020-03-28 RX ADMIN — Medication: at 20:11

## 2020-03-28 RX ADMIN — PROPOFOL 20 MCG/KG/MIN: 10 INJECTION, EMULSION INTRAVENOUS at 02:10

## 2020-03-28 RX ADMIN — MICONAZOLE NITRATE: 20 POWDER TOPICAL at 20:11

## 2020-03-28 RX ADMIN — POLYETHYLENE GLYCOL (3350) 17 G: 17 POWDER, FOR SOLUTION ORAL at 20:11

## 2020-03-28 RX ADMIN — MICONAZOLE NITRATE: 20 POWDER TOPICAL at 08:41

## 2020-03-28 RX ADMIN — PANTOPRAZOLE SODIUM 40 MG: 40 INJECTION, POWDER, FOR SOLUTION INTRAVENOUS at 08:41

## 2020-03-28 RX ADMIN — PROPOFOL 20 MCG/KG/MIN: 10 INJECTION, EMULSION INTRAVENOUS at 08:42

## 2020-03-28 ASSESSMENT — PULMONARY FUNCTION TESTS
PIF_VALUE: 34
PIF_VALUE: 29
PIF_VALUE: 27
PIF_VALUE: 26
PIF_VALUE: 23
PIF_VALUE: 27
PIF_VALUE: 25
PIF_VALUE: 32
PIF_VALUE: 26
PIF_VALUE: 13
PIF_VALUE: 28
PIF_VALUE: 14
PIF_VALUE: 26
PIF_VALUE: 31
PIF_VALUE: 28

## 2020-03-28 ASSESSMENT — PAIN SCALES - GENERAL
PAINLEVEL_OUTOF10: 0

## 2020-03-28 NOTE — PROGRESS NOTES
Hospitalist Progress Note      PCP: Ashlee Scott    Date of Admission: 3/26/2020    Chief Complaint: sob    Subjective:  He is intubated  Follows commands, able to nod when questioned      Medications:  Reviewed    Infusion Medications    dextrose      propofol 20 mcg/kg/min (03/28/20 0842)     Scheduled Medications    chlorhexidine  15 mL Mouth/Throat BID    pantoprazole  40 mg Intravenous Daily    Venelex   Topical BID    vancomycin (VANCOCIN) intermittent dosing (placeholder)   Other See Admin Instructions    [Held by provider] amLODIPine  5 mg Oral Daily    atorvastatin  80 mg Oral Nightly    miconazole   Topical BID    polyethylene glycol  17 g Oral Nightly    sodium chloride flush  10 mL Intravenous 2 times per day    insulin glargine  8 Units Subcutaneous Nightly    insulin lispro  0-12 Units Subcutaneous TID WC    meropenem  500 mg Intravenous Q24H    [START ON 4/2/2020] darbepoetin mateo-polysorbate  60 mcg Intravenous Weekly - Thursday     PRN Meds: bisacodyl, sodium chloride flush, acetaminophen **OR** acetaminophen, promethazine **OR** ondansetron, glucose, dextrose, glucagon (rDNA), dextrose, albuterol sulfate HFA, heparin (porcine)      Intake/Output Summary (Last 24 hours) at 3/28/2020 1120  Last data filed at 3/28/2020 0837  Gross per 24 hour   Intake 1414 ml   Output 2310 ml   Net -896 ml       Physical Exam Performed:    BP (!) 115/59   Pulse 105   Temp 97.6 °F (36.4 °C) (Axillary)   Resp 21   Ht 5' 4\" (1.626 m)   Wt 173 lb 11.6 oz (78.8 kg)   SpO2 100%   BMI 29.82 kg/m²     General appearance: Intubated and sedated   HEENT: Pupils equal, round, and reactive to light. Conjunctivae/corneas clear. Neck: Supple, with full range of motion. No jugular venous distention. Trachea midline. Respiratory:  Normal respiratory effort. Breath sounds  Cardiovascular: Regular rate and rhythm with normal S1/S2 without murmurs, rubs or gallops.   Abdomen: Soft, non-tender, MD  Hospitalist

## 2020-03-28 NOTE — PROGRESS NOTES
(thrombocytopenia)    ASSESSMENT/PLAN:  1)  Sepsis 2/2 post-obstructive PNA: Azithromycin + Meropenem + Vancomycin - Day #3  Vancomycin--pharmacy to dose  · Pt recently completed course of IV vancomycin (last dose 3/24 with HD). · Pt did not complete HD session on 3/26. Next HD session planned for today. · Vancomycin level this AM = 19.9 mcg/mL after receiving 1g IV on 3/26. As HD completed today, then will order a 500mg IV x 1 for today. No further doses/levels needed until Tues with next HD. · MRSA nasal PCR negative. Recommend to stop vancomycin. Meropenem--pharmacy to dose  · 500 mg IV q24h appropriate for ESRD on HD. · Will continue to monitor. Thank you for the consult -- Please call with any questions.   Brady KuD., BCPS   3/28/2020 7:27 AM  Wireless: [phone]

## 2020-03-28 NOTE — PROGRESS NOTES
(LIPITOR) tablet 80 mg, 80 mg, Oral, Nightly  bisacodyl (DULCOLAX) EC tablet 5 mg, 5 mg, Oral, Daily PRN  miconazole (MICOTIN) 2 % powder, , Topical, BID  polyethylene glycol (GLYCOLAX) packet 17 g, 17 g, Oral, Nightly  sodium chloride flush 0.9 % injection 10 mL, 10 mL, Intravenous, 2 times per day  sodium chloride flush 0.9 % injection 10 mL, 10 mL, Intravenous, PRN  acetaminophen (TYLENOL) tablet 650 mg, 650 mg, Oral, Q6H PRN **OR** acetaminophen (TYLENOL) suppository 650 mg, 650 mg, Rectal, Q6H PRN  promethazine (PHENERGAN) tablet 12.5 mg, 12.5 mg, Oral, Q6H PRN **OR** ondansetron (ZOFRAN) injection 4 mg, 4 mg, Intravenous, Q6H PRN  glucose (GLUTOSE) 40 % oral gel 15 g, 15 g, Oral, PRN  dextrose 50 % IV solution, 12.5 g, Intravenous, PRN  glucagon (rDNA) injection 1 mg, 1 mg, Intramuscular, PRN  dextrose 5 % solution, 100 mL/hr, Intravenous, PRN  insulin glargine (LANTUS;BASAGLAR) injection pen 8 Units, 8 Units, Subcutaneous, Nightly  insulin lispro (1 Unit Dial) 0-12 Units, 0-12 Units, Subcutaneous, TID WC  meropenem (MERREM) 500 mg IVPB (mini-bag), 500 mg, Intravenous, Q24H  [START ON 4/2/2020] darbepoetin mateo-polysorbate (ARANESP) injection 60 mcg, 60 mcg, Intravenous, Weekly - Thursday  albuterol sulfate  (90 Base) MCG/ACT inhaler 2 puff, 2 puff, Inhalation, Q6H PRN  heparin (porcine) injection 4,500 Units, 4,500 Units, Intracatheter, PRN  propofol injection, 10 mcg/kg/min, Intravenous, Titrated       Vitals :     Vitals:    03/28/20 1200   BP:    Pulse:    Resp:    Temp: 97.9 °F (36.6 °C)   SpO2:        I & O :       Intake/Output Summary (Last 24 hours) at 3/28/2020 1335  Last data filed at 3/28/2020 3664  Gross per 24 hour   Intake 1414 ml   Output 2310 ml   Net -896 ml        Physical Examination :     General appearance: Intubated    HEENT: Lips- normal, teeth- ok , oral mucosa- moist  Neck : Mass- no, appears symmetrical, JVD- not visible  Respiratory: Ventilator  Cardiovascular:  Ausculation-

## 2020-03-28 NOTE — PROGRESS NOTES
ICU Progress Note    Admit Date: 3/26/2020  Day: 3  Vent Day: 3  IV Access:Peripheral  IV Fluids:None  Vasopressors:None                Antibiotics: Vancomycin, merrem, azithromycin  Diet: Diet NPO, After Midnight    CC: SOB    Interval history: COVID resulted negative overnight. Afebrile, no leukocytosis. HR 80s-110s (ST), with SBP 80s-120s overnight.     This AM she is sedated on ventilator, minimally responsive to commands    Medications:     Scheduled Meds:   chlorhexidine  15 mL Mouth/Throat BID    pantoprazole  40 mg Intravenous Daily    Venelex   Topical BID    vancomycin (VANCOCIN) intermittent dosing (placeholder)   Other See Admin Instructions    [Held by provider] amLODIPine  5 mg Oral Daily    atorvastatin  80 mg Oral Nightly    miconazole   Topical BID    polyethylene glycol  17 g Oral Nightly    sodium chloride flush  10 mL Intravenous 2 times per day    insulin glargine  8 Units Subcutaneous Nightly    insulin lispro  0-12 Units Subcutaneous TID WC    meropenem  500 mg Intravenous Q24H    [START ON 4/2/2020] darbepoetin mateo-polysorbate  60 mcg Intravenous Weekly - Thursday     Continuous Infusions:   dextrose      propofol 20 mcg/kg/min (03/28/20 0842)     PRN Meds:bisacodyl, sodium chloride flush, acetaminophen **OR** acetaminophen, promethazine **OR** ondansetron, glucose, dextrose, glucagon (rDNA), dextrose, albuterol sulfate HFA, heparin (porcine)    Objective:   Vitals:   T-max:  Patient Vitals for the past 8 hrs:   BP Temp Temp src Pulse Resp SpO2 Weight   03/28/20 1200 -- 97.9 °F (36.6 °C) Axillary -- -- -- --   03/28/20 0830 (!) 115/59 97.6 °F (36.4 °C) Axillary 105 21 100 % 173 lb 11.6 oz (78.8 kg)   03/28/20 0827 -- -- -- 106 19 100 % --   03/28/20 0815 (!) 96/51 -- -- 109 20 100 % --   03/28/20 0800 (!) 100/48 97.9 °F (36.6 °C) Axillary 110 20 100 % --   03/28/20 0745 (!) 96/49 -- -- 109 23 100 % --   03/28/20 0730 (!) 95/45 -- -- 110 21 99 % --   03/28/20 0715 (!) 100/52 -- -- 107 21 99 % --   03/28/20 0700 (!) 91/45 -- -- 107 22 100 % --   03/28/20 0645 (!) 88/52 -- -- 107 22 97 % --   03/28/20 0630 (!) 90/50 -- -- 107 23 96 % --   03/28/20 0615 (!) 95/48 -- -- 105 20 98 % --   03/28/20 0600 (!) 106/53 -- -- 100 22 100 % --   03/28/20 0545 112/63 -- -- 94 21 100 % --   03/28/20 0530 (!) 116/57 -- -- 92 22 100 % --   03/28/20 0515 (!) 117/55 97.8 °F (36.6 °C) Axillary 96 18 100 % 177 lb 14.6 oz (80.7 kg)   03/28/20 0502 -- -- -- 96 16 100 % --   03/28/20 0500 127/62 -- -- 99 22 100 % --       Intake/Output Summary (Last 24 hours) at 3/28/2020 1241  Last data filed at 3/28/2020 2777  Gross per 24 hour   Intake 1414 ml   Output 2310 ml   Net -896 ml       Review of Systems   Unable to perform ROS: Intubated       Physical Exam  Constitutional:       General: She is not in acute distress. Appearance: She is not diaphoretic. HENT:      Head: Normocephalic and atraumatic. Right Ear: External ear normal.      Left Ear: External ear normal.      Nose: Nose normal. No rhinorrhea. Comments: Intubated, on ventilator     Mouth/Throat:      Mouth: Mucous membranes are moist.      Comments: Intubated, on ventilator  Eyes:      General: No scleral icterus. Right eye: No discharge. Left eye: No discharge. Conjunctiva/sclera: Conjunctivae normal.   Neck:      Musculoskeletal: Neck supple. Cardiovascular:      Rate and Rhythm: Regular rhythm. Tachycardia present. Pulses: Normal pulses. Heart sounds: Normal heart sounds. Pulmonary:      Breath sounds: Rhonchi present. No wheezing. Comments: Intubated, on ventilator, scattered coarse breath sounds  Abdominal:      General: There is no distension. Palpations: Abdomen is soft. Musculoskeletal:         General: No swelling. Right lower leg: No edema. Left lower leg: No edema. Lymphadenopathy:      Cervical: No cervical adenopathy. Skin:     General: Skin is warm and dry. Neurological:      Comments: Sedated, intubated on ventilator, minimally responsive to commands - briefly and inconsistently glances at examiner and wiggles fingers   Psychiatric:      Comments: Sedated, intubated on ventilator           LABS:    CBC:   Recent Labs     03/26/20  1847 03/27/20  0547 03/28/20  0351   WBC 20.8* 10.3 8.3   HGB 7.7* 7.0* 7.3*   HCT 25.7* 22.3* 23.5*   PLT 30* 33* 53*   MCV 93.2 92.0 92.8     Renal:    Recent Labs     03/26/20  1750 03/27/20  0547 03/28/20  0351   * 134* 133*   K 4.7 3.7 4.0   CL 92* 96* 96*   CO2 22 26 24   BUN 16 19 23*   CREATININE 1.6* 1.8* 2.1*   GLUCOSE 244* 100* 135*   CALCIUM 10.3 10.2 10.1   PHOS  --  3.0 3.5   ANIONGAP 21* 12 13     Hepatic:   Recent Labs     03/26/20  1032 03/27/20  0547 03/28/20  0351   AST 20  --   --    ALT 8*  --   --    BILITOT 0.4  --   --    BILIDIR <0.2  --   --    PROT 7.1  --   --    LABALBU 3.5 2.9* 2.9*   ALKPHOS 92  --   --      Troponin:   Recent Labs     03/26/20  1032 03/26/20  2101   TROPONINI 0.46* 0.46*     BNP: No results for input(s): BNP in the last 72 hours. Lipids: No results for input(s): CHOL, HDL in the last 72 hours. Invalid input(s): LDLCALCU, TRIGLYCERIDE  ABGs:    Recent Labs     03/26/20  1842   PHART 7.255*   XUF5QXY 74.4*   PO2ART 98.9   DFK1CJL 33.0*   BEART 6*   Y0HEMMNJ 96   HMQ3NKE 35       INR:   Recent Labs     03/26/20  1032   INR 1.07     Lactate:   Recent Labs     03/26/20  1842   LACTATE 2.05*     Cultures:  -----------------------------------------------------------------  RAD:   XR CHEST PORTABLE   Final Result      ET tube tip at the thoracic inlet. Bilateral airspace disease and pleural effusions            CT CHEST WO CONTRAST   Final Result      Occlusion of the right middle lobe bronchus with associated collapse of the right middle lobe. Interval increase in bilateral nodular and groundglass opacities.  This is nonspecific, however, may reflect infectious or inflammatory

## 2020-03-29 LAB
ALBUMIN SERPL-MCNC: 2.8 G/DL (ref 3.4–5)
ANION GAP SERPL CALCULATED.3IONS-SCNC: 10 MMOL/L (ref 3–16)
ANISOCYTOSIS: ABNORMAL
BASOPHILS ABSOLUTE: 0 K/UL (ref 0–0.2)
BASOPHILS ABSOLUTE: 0.1 K/UL (ref 0–0.2)
BASOPHILS RELATIVE PERCENT: 0.6 %
BASOPHILS RELATIVE PERCENT: 1.2 %
BLOOD BANK DISPENSE STATUS: NORMAL
BLOOD BANK PRODUCT CODE: NORMAL
BPU ID: NORMAL
BUN BLDV-MCNC: 15 MG/DL (ref 7–20)
CALCIUM SERPL-MCNC: 9.6 MG/DL (ref 8.3–10.6)
CHLORIDE BLD-SCNC: 98 MMOL/L (ref 99–110)
CO2: 25 MMOL/L (ref 21–32)
CREAT SERPL-MCNC: 1.7 MG/DL (ref 0.6–1.2)
DESCRIPTION BLOOD BANK: NORMAL
EOSINOPHILS ABSOLUTE: 0.4 K/UL (ref 0–0.6)
EOSINOPHILS ABSOLUTE: 0.5 K/UL (ref 0–0.6)
EOSINOPHILS RELATIVE PERCENT: 6.2 %
EOSINOPHILS RELATIVE PERCENT: 6.7 %
GFR AFRICAN AMERICAN: 35
GFR NON-AFRICAN AMERICAN: 29
GLUCOSE BLD-MCNC: 102 MG/DL (ref 70–99)
GLUCOSE BLD-MCNC: 75 MG/DL (ref 70–99)
GLUCOSE BLD-MCNC: 86 MG/DL (ref 70–99)
GLUCOSE BLD-MCNC: 97 MG/DL (ref 70–99)
HCT VFR BLD CALC: 21.3 % (ref 36–48)
HCT VFR BLD CALC: 21.6 % (ref 36–48)
HCT VFR BLD CALC: 26.2 % (ref 36–48)
HEMOGLOBIN: 6.7 G/DL (ref 12–16)
HEMOGLOBIN: 6.8 G/DL (ref 12–16)
HEMOGLOBIN: 8.3 G/DL (ref 12–16)
HYPOCHROMIA: ABNORMAL
LYMPHOCYTES ABSOLUTE: 1.2 K/UL (ref 1–5.1)
LYMPHOCYTES ABSOLUTE: 1.4 K/UL (ref 1–5.1)
LYMPHOCYTES RELATIVE PERCENT: 17.9 %
LYMPHOCYTES RELATIVE PERCENT: 19.5 %
MCH RBC QN AUTO: 28.5 PG (ref 26–34)
MCH RBC QN AUTO: 29.1 PG (ref 26–34)
MCHC RBC AUTO-ENTMCNC: 31.2 G/DL (ref 31–36)
MCHC RBC AUTO-ENTMCNC: 32.1 G/DL (ref 31–36)
MCV RBC AUTO: 90.6 FL (ref 80–100)
MCV RBC AUTO: 91.3 FL (ref 80–100)
MONOCYTES ABSOLUTE: 0.7 K/UL (ref 0–1.3)
MONOCYTES ABSOLUTE: 0.7 K/UL (ref 0–1.3)
MONOCYTES RELATIVE PERCENT: 11 %
MONOCYTES RELATIVE PERCENT: 9.8 %
NEUTROPHILS ABSOLUTE: 4.2 K/UL (ref 1.7–7.7)
NEUTROPHILS ABSOLUTE: 4.6 K/UL (ref 1.7–7.7)
NEUTROPHILS RELATIVE PERCENT: 63.4 %
NEUTROPHILS RELATIVE PERCENT: 63.7 %
OVALOCYTES: ABNORMAL
PDW BLD-RTO: 20.7 % (ref 12.4–15.4)
PDW BLD-RTO: 20.9 % (ref 12.4–15.4)
PERFORMED ON: ABNORMAL
PERFORMED ON: NORMAL
PERFORMED ON: NORMAL
PHOSPHORUS: 3.3 MG/DL (ref 2.5–4.9)
PLATELET # BLD: 27 K/UL (ref 135–450)
PLATELET # BLD: 38 K/UL (ref 135–450)
PLATELET SLIDE REVIEW: ABNORMAL
PMV BLD AUTO: 10.5 FL (ref 5–10.5)
PMV BLD AUTO: 9.6 FL (ref 5–10.5)
POTASSIUM SERPL-SCNC: 4 MMOL/L (ref 3.5–5.1)
RBC # BLD: 2.35 M/UL (ref 4–5.2)
RBC # BLD: 2.37 M/UL (ref 4–5.2)
SCHISTOCYTES: ABNORMAL
SLIDE REVIEW: ABNORMAL
SODIUM BLD-SCNC: 133 MMOL/L (ref 136–145)
TEAR DROP CELLS: ABNORMAL
WBC # BLD: 6.6 K/UL (ref 4–11)
WBC # BLD: 7.2 K/UL (ref 4–11)

## 2020-03-29 PROCEDURE — 36430 TRANSFUSION BLD/BLD COMPNT: CPT

## 2020-03-29 PROCEDURE — 6370000000 HC RX 637 (ALT 250 FOR IP): Performed by: INTERNAL MEDICINE

## 2020-03-29 PROCEDURE — 2580000003 HC RX 258: Performed by: STUDENT IN AN ORGANIZED HEALTH CARE EDUCATION/TRAINING PROGRAM

## 2020-03-29 PROCEDURE — 94761 N-INVAS EAR/PLS OXIMETRY MLT: CPT

## 2020-03-29 PROCEDURE — P9016 RBC LEUKOCYTES REDUCED: HCPCS

## 2020-03-29 PROCEDURE — 85018 HEMOGLOBIN: CPT

## 2020-03-29 PROCEDURE — 85025 COMPLETE CBC W/AUTO DIFF WBC: CPT

## 2020-03-29 PROCEDURE — 1200000000 HC SEMI PRIVATE

## 2020-03-29 PROCEDURE — 85014 HEMATOCRIT: CPT

## 2020-03-29 PROCEDURE — 6370000000 HC RX 637 (ALT 250 FOR IP): Performed by: STUDENT IN AN ORGANIZED HEALTH CARE EDUCATION/TRAINING PROGRAM

## 2020-03-29 PROCEDURE — C9113 INJ PANTOPRAZOLE SODIUM, VIA: HCPCS | Performed by: INTERNAL MEDICINE

## 2020-03-29 PROCEDURE — 6360000002 HC RX W HCPCS: Performed by: STUDENT IN AN ORGANIZED HEALTH CARE EDUCATION/TRAINING PROGRAM

## 2020-03-29 PROCEDURE — 99233 SBSQ HOSP IP/OBS HIGH 50: CPT | Performed by: INTERNAL MEDICINE

## 2020-03-29 PROCEDURE — 6360000002 HC RX W HCPCS: Performed by: INTERNAL MEDICINE

## 2020-03-29 PROCEDURE — 80069 RENAL FUNCTION PANEL: CPT

## 2020-03-29 PROCEDURE — 2700000000 HC OXYGEN THERAPY PER DAY

## 2020-03-29 PROCEDURE — 36415 COLL VENOUS BLD VENIPUNCTURE: CPT

## 2020-03-29 RX ORDER — 0.9 % SODIUM CHLORIDE 0.9 %
20 INTRAVENOUS SOLUTION INTRAVENOUS ONCE
Status: DISCONTINUED | OUTPATIENT
Start: 2020-03-29 | End: 2020-04-03

## 2020-03-29 RX ADMIN — PANTOPRAZOLE SODIUM 40 MG: 40 INJECTION, POWDER, FOR SOLUTION INTRAVENOUS at 08:34

## 2020-03-29 RX ADMIN — MEROPENEM 500 MG: 500 INJECTION, POWDER, FOR SOLUTION INTRAVENOUS at 22:43

## 2020-03-29 RX ADMIN — Medication 10 ML: at 08:34

## 2020-03-29 RX ADMIN — CHLORHEXIDINE GLUCONATE 0.12% ORAL RINSE 15 ML: 1.2 LIQUID ORAL at 08:34

## 2020-03-29 RX ADMIN — Medication 10 ML: at 22:44

## 2020-03-29 RX ADMIN — CHLORHEXIDINE GLUCONATE 0.12% ORAL RINSE 15 ML: 1.2 LIQUID ORAL at 22:57

## 2020-03-29 RX ADMIN — ATORVASTATIN CALCIUM 80 MG: 80 TABLET, FILM COATED ORAL at 22:57

## 2020-03-29 RX ADMIN — MICONAZOLE NITRATE: 20 POWDER TOPICAL at 22:44

## 2020-03-29 RX ADMIN — MICONAZOLE NITRATE: 20 POWDER TOPICAL at 08:34

## 2020-03-29 RX ADMIN — INSULIN GLARGINE 8 UNITS: 100 INJECTION, SOLUTION SUBCUTANEOUS at 22:57

## 2020-03-29 RX ADMIN — Medication: at 22:44

## 2020-03-29 RX ADMIN — Medication: at 08:34

## 2020-03-29 ASSESSMENT — ENCOUNTER SYMPTOMS
CHOKING: 0
NAUSEA: 0
EYES NEGATIVE: 1
VOMITING: 0
GASTROINTESTINAL NEGATIVE: 1
SHORTNESS OF BREATH: 0
DIARRHEA: 0
ABDOMINAL DISTENTION: 0
COUGH: 0
WHEEZING: 0
STRIDOR: 0
RESPIRATORY NEGATIVE: 1

## 2020-03-29 ASSESSMENT — PAIN SCALES - GENERAL: PAINLEVEL_OUTOF10: 0

## 2020-03-29 NOTE — PROGRESS NOTES
rubs or gallops. Abdomen: Soft, non-tender, non-distended with normal bowel sounds. Musculoskeletal: No clubbing, cyanosis or edema bilaterally. Full range of motion without deformity. Skin: Skin color, texture, turgor normal.  No rashes or lesions. Neurologic: moving all extremities, grossly nonfocal exam  Psychiatric: Alert, oriented, calm  Capillary Refill: Brisk,< 3 seconds   Peripheral Pulses: +2 palpable, equal bilaterally       Labs:   Recent Labs     03/27/20  0547 03/28/20  0351 03/29/20  0509   WBC 10.3 8.3 7.2   HGB 7.0* 7.3* 6.8*   HCT 22.3* 23.5* 21.3*   PLT 33* 53* 27*     Recent Labs     03/27/20  0547 03/28/20  0351 03/29/20  0509   * 133* 133*   K 3.7 4.0 4.0   CL 96* 96* 98*   CO2 26 24 25   BUN 19 23* 15   CREATININE 1.8* 2.1* 1.7*   CALCIUM 10.2 10.1 9.6   PHOS 3.0 3.5 3.3     Recent Labs     03/26/20  1032   AST 20   ALT 8*   BILIDIR <0.2   BILITOT 0.4   ALKPHOS 92     Recent Labs     03/26/20  1032   INR 1.07     Recent Labs     03/26/20  1032 03/26/20  2101   TROPONINI 0.46* 0.46*       Urinalysis:      Lab Results   Component Value Date    NITRU Negative 03/21/2020    WBCUA 21-50 03/21/2020    BACTERIA 1+ 03/21/2020    RBCUA  03/21/2020    BLOODU LARGE 03/21/2020    SPECGRAV 1.020 03/21/2020    GLUCOSEU Negative 03/21/2020       Radiology:  XR CHEST PORTABLE   Final Result      ET tube tip at the thoracic inlet. Bilateral airspace disease and pleural effusions            CT CHEST WO CONTRAST   Final Result      Occlusion of the right middle lobe bronchus with associated collapse of the right middle lobe. Interval increase in bilateral nodular and groundglass opacities. This is nonspecific, however, may reflect infectious or inflammatory etiologies, particularly given the increased from the prior study. Areas of septal thickening are also noted, with    which can be seen in the setting of edema. Moderate bilateral pleural effusions.             XR CHEST PORTABLE

## 2020-03-29 NOTE — PROGRESS NOTES
Constitutional: Negative. HENT: Negative. Eyes: Negative. Respiratory: Negative. Negative for cough, choking, shortness of breath, wheezing and stridor. Cardiovascular: Negative. Negative for chest pain, palpitations and leg swelling. Gastrointestinal: Negative. Negative for abdominal distention, diarrhea, nausea and vomiting. Genitourinary: Negative. Negative for dysuria. Musculoskeletal: Negative. Skin: Negative. Neurological: Negative. Negative for dizziness, light-headedness, numbness and headaches. Psychiatric/Behavioral: Negative. Physical Exam  Constitutional:       General: She is not in acute distress. Appearance: She is not toxic-appearing or diaphoretic. Comments: Lethargic but awakens easily to voice    HENT:      Head: Normocephalic and atraumatic. Eyes:      General: No scleral icterus. Right eye: No discharge. Left eye: No discharge. Extraocular Movements: Extraocular movements intact. Conjunctiva/sclera: Conjunctivae normal.      Pupils: Pupils are equal, round, and reactive to light. Cardiovascular:      Rate and Rhythm: Normal rate and regular rhythm. Pulses: Normal pulses. Heart sounds: Normal heart sounds. No murmur. No friction rub. Pulmonary:      Effort: Pulmonary effort is normal. No respiratory distress. Breath sounds: Normal breath sounds. No wheezing. Abdominal:      General: Abdomen is flat. Bowel sounds are normal. There is no distension. Palpations: Abdomen is soft. Musculoskeletal: Normal range of motion. Right lower leg: No edema. Left lower leg: No edema. Skin:     General: Skin is warm and dry. Coloration: Skin is not jaundiced. Neurological:      General: No focal deficit present. Mental Status: She is oriented to person, place, and time. Mental status is at baseline. Cranial Nerves: No cranial nerve deficit. Sensory: No sensory deficit. opacities. This is nonspecific, however, may reflect infectious or inflammatory etiologies, particularly given the increased from the prior study. Areas of septal thickening are also noted, with    which can be seen in the setting of edema. Moderate bilateral pleural effusions. XR CHEST PORTABLE   Final Result      Dense multifocal consolidation involving the right lower lung and left mid/lower lung. Asymmetric pulmonary edema and multifocal pneumonia are considerations. Top normal heart size without change. Right jugular dialysis catheter in standard position. Right axillary surgical clips noted. Assessment/Plan:      Ladan Guerrier a 78 y. o. female, who was admitted for SOB and suspected sepsis 2/2 post-obstructive pneumonia c/b probable superimposed viral PNA as well as COVID rule out.      Acute hypercapnic respiratory failure 2/2 Post-Obstructive Pneumonia and mucus plugging: intubated due to respiratory failure, unable to use BIPAP in setting of COVID r/o. CT w/ RML occlusion and collapse, bilateral airspace disease. Influenza negative. [employer] viral PNA panel negative. MRSA nares negative  - COVID negative  - c/w merrem  - d/c vancomycin and azithromycin as MRSA nares and COVID negative  - Attempt sedation holiday vent wean, likely bronch today     Chronic Anemia:   -CBC this am Hb 6.8   -1unit pRBC   -F/U H/H    Chronic Thrombocytopenia: chronic PLT 27  -monitor     ESRD on HD Tu, Th, Sat  - c/w HD Tu, Th, Sa  - nephrology following     Chronic Diastolic Heart Failure  - stable, euvolemia via HD     Troponemia 2/2 demand ischemia and ESRD  - stable     IDDM: bg well controlled on current regimen.  a1c 5.5 12/2019  - Lantus 8u qhs, MDSSI  - accuchecks, hypoglycemia protocol     Code Status: Full  FEN: Tube feeds   PPX: SQH  DISPO: ICU       Dameon Mora MD, PGY-1  03/29/20  10:12 AM    This patient has been staffed and discussed with Dr Davi Correa

## 2020-03-29 NOTE — PROGRESS NOTES
size of the collection is difficult to differentiate from adjacent lung on noncontrast images, but measures at least 2.6 cm (series 205 image 167). HPI:   Mrs. Arlena Rinne is an 77yo female PMH CAD, Chronic Diastolic CHF, ESRD on HD, COPD, HTN, HLD, coming from HD with SOB.               Patient was just discharged 1 day prior to presenting to [facility] ED with complaints of shortness of breath. She was recently admitted (3/11/2020-3/20/2020) for treatment of pyelonephritis and was treated with vancomycin and Merrem. At [facility] ED, labs showed increased leukocytosis of 24K and elevated troponin to 0.51. CT chest showed occlusion of RML bronchus likely due to mucous plugging with associated atelectatic collapse and small to moderate pleural effusion. There was concern for sepsis/lung abscess or post obstructive PNA. Patient's guardian reportedly requested transfer to [facility] for further evaluation and treatment. Patient currently states her breathing is much better. Denies shortness of breath, chest pain, fever, chills, nausea, emesis or abdominal pain. Complains of pain in her buttock area, rated 4-5/10 in severity. Pain is improved with position changes and pain meds.     Brief history and the reason for the admission:               The patient was hospitalized between 03/11/2020 to 03/20/2020 for fever and altered mental status and patient had the ICU stay due to hypoxia. The patient had recent diagnosis of misplaced PEG tube. An episode of pyelonephritis treated with vancomycin and meropenem. After prolonged hospital stay, the patient was discharged on [id number]. At the time when she was discharged, she was doing well. Unfortunately, she re-presented to the [facility] again on 03/21/2020 for worsening of shortness of breath.  Over at [facility] Emergency Room, the patient was found have a leukocytosis and elevated troponin level and because of that, the patient has been transferred back to the [facility] for further evaluation treatment.              This patient has very complex and complicated medical problems in recent past. She has a lot of hospitalizations because of a variety of reasons. 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 She also has history of for C diff colitis. Vitals:    03/29/20 1200   BP:    Pulse:    Resp: 21   Temp: 98.1 °F (36.7 °C)   SpO2:      Scheduled Meds:   sodium chloride  20 mL Intravenous Once    [START ON 4/2/2020] darbepoetin mateo-polysorbate  200 mcg Intravenous Weekly - Thursday    chlorhexidine  15 mL Mouth/Throat BID    pantoprazole  40 mg Intravenous Daily    Venelex   Topical BID    [Held by provider] amLODIPine  5 mg Oral Daily    atorvastatin  80 mg Oral Nightly    miconazole   Topical BID    polyethylene glycol  17 g Oral Nightly    sodium chloride flush  10 mL Intravenous 2 times per day    insulin glargine  8 Units Subcutaneous Nightly    insulin lispro  0-12 Units Subcutaneous TID WC    meropenem  500 mg Intravenous Q24H     Continuous Infusions:   dextrose      propofol Stopped (03/28/20 1536)     PRN Meds:bisacodyl, sodium chloride flush, acetaminophen **OR** acetaminophen, promethazine **OR** ondansetron, glucose, dextrose, glucagon (rDNA), dextrose, albuterol sulfate HFA, heparin (porcine)    Constitutional:       General: She is not in acute distress. Appearance: She is not toxic-appearing or diaphoretic. Comments: Lethargic but awakens easily to voice    HENT:      Head: Normocephalic and atraumatic. Eyes:      General: No scleral icterus.         Right eye: No discharge. Left eye: No discharge. Extraocular Movements: Extraocular movements intact. Conjunctiva/sclera: Conjunctivae normal.      Pupils: Pupils are equal, round, and reactive to light. Cardiovascular:      Rate and Rhythm: Normal rate and regular rhythm. Pulses: Normal pulses. Heart sounds: Normal heart sounds. No murmur. No friction rub. Pulmonary:      Effort: Pulmonary effort is normal. No respiratory distress. Breath sounds: Normal breath sounds. No wheezing. Abdominal:      General: Abdomen is flat. Bowel sounds are normal. There is no distension. Palpations: Abdomen is soft. Musculoskeletal: Normal range of motion. Right lower leg: No edema. Left lower leg: No edema. Skin:     General: Skin is warm and dry. Coloration: Skin is not jaundiced. Neurological:      General: No focal deficit present. Mental Status: She is oriented to person, place, and time. Mental status is at baseline. Cranial Nerves: No cranial nerve deficit. Sensory: No sensory deficit. Motor: No weakness. Gait: Gait normal.   Psychiatric:         Mood and Affect: Mood normal.         Behavior: Behavior normal.         Thought Content: Thought content normal.         Judgment: Judgment normal.     The objective and subjective findings as well as the ICU course of treatment have been reviewed with the ICU team. The treatment plan has been reviewed with the ICU team. The patient is being transferred to [place] [id number] in stable condition.     Khanh Arreola MD

## 2020-03-29 NOTE — PROGRESS NOTES
[facility]    [facility]. [facility]              [phone]                       Plan :     · Labs reviewed. Tolerated HD Saturday and removed 2 L and post weight is 78.8 with TW of 78 kg. · She was transferred to ICU previously tachypneic, tachycardic. · She is extubated now and WAS RULED OUT for COVID 19. On antibiotics. · Off Sensipar as PTH is 73 and iron tablets. · Monitor vancomycin level now 19.9.  · Hgb 6.7 and transfused. TADEO 60 mcg increased to 200  with HD and check Iron studies. Assessment :     1. ESRD:  Will plan HD per schedule Tuesday, Saturday  and Thursday. She goes to [facility] for hemodialysis. Access: Hemodialysis catheter  Daily weights  Fluid restriction: 1 L  Nephrocap 1 tab PO daily    2. Anemia:  Erythropoetin dose: Continue Aranesp with dialysis to keep hemoglobin between 10-12. Transfuse hemoglobin is less than 6.      3.  Osteodystrophy:  Phosphate Binder: Continue Sensipar daily. Will check phosphorus, PTH and vitamin D.    4.  Shortness of breath: She does have leukocytosis but also of a collapsed lung. She is on antibiotics. Please monitor vancomycin level. 5.  Hypertension: Continue amlodipine         [facility] would like to thank Mani Long MD   for opportunity to serve this patient      Please call with questions at-   24 Hrs Answering service [phone] or  7 am- 5 pm via Perfect serve or cell phone  Lorena Burgess          CC/reason for consult :     ESRD on hemodialysis     HPI :     Briseyda Crum is a 78 y.o. female presented to   the hospital on 3/26/2020 with shortness of breath.     she has past medical history significant for chronic respiratory issues, status post tracheostomy, bilateral carotid artery stenosis, congestive heart failure, history of aortic stenosis status post TAVR, ESRD on hemodialysis which was recently started, history of complete heart block requiring pacemaker and history of cardiac visible  Respiratory: Ventilator  Cardiovascular:  Ausculation- No M/R/G, Edema none  Abdomen: visible mass- no, distention- no, scar- no, tenderness- no                            hepatosplenomegaly-  no  Musculoskeletal:  clubbing no,cyanosis- no , digital ischemia- no                         Skin: rashes- no , ulcers- no, induration- no, tightening - no  Psychiatric: Not obtained  Additional finding: HD catheter     LABS:     Recent Labs     03/28/20  0351 03/29/20  0509 03/29/20  1001   WBC 8.3 7.2 6.6   HGB 7.3* 6.8* 6.7*   HCT 23.5* 21.3* 21.6*   PLT 53* 27* 38*     Recent Labs     03/27/20  0547 03/28/20  0351 03/29/20  0509   * 133* 133*   K 3.7 4.0 4.0   CL 96* 96* 98*   CO2 26 24 25   BUN 19 23* 15   CREATININE 1.8* 2.1* 1.7*   GLUCOSE 100* 135* 75   PHOS 3.0 3.5 3.3      Nephrology  [address] Cast[address]eda [address], [address]  Office: [phone]  Cell: [phone]  Fax: [phone]

## 2020-03-30 ENCOUNTER — APPOINTMENT (OUTPATIENT)
Dept: GENERAL RADIOLOGY | Age: 80
DRG: 871 | End: 2020-03-30
Payer: MEDICARE

## 2020-03-30 LAB
ALBUMIN SERPL-MCNC: 2.8 G/DL (ref 3.4–5)
ANION GAP SERPL CALCULATED.3IONS-SCNC: 12 MMOL/L (ref 3–16)
BASOPHILS ABSOLUTE: 0.1 K/UL (ref 0–0.2)
BASOPHILS RELATIVE PERCENT: 0.8 %
BLOOD CULTURE, ROUTINE: NORMAL
BUN BLDV-MCNC: 22 MG/DL (ref 7–20)
CALCIUM SERPL-MCNC: 9.8 MG/DL (ref 8.3–10.6)
CHLORIDE BLD-SCNC: 101 MMOL/L (ref 99–110)
CO2: 25 MMOL/L (ref 21–32)
CREAT SERPL-MCNC: 2.2 MG/DL (ref 0.6–1.2)
CULTURE, BLOOD 2: NORMAL
EOSINOPHILS ABSOLUTE: 0.4 K/UL (ref 0–0.6)
EOSINOPHILS RELATIVE PERCENT: 5 %
GFR AFRICAN AMERICAN: 26
GFR NON-AFRICAN AMERICAN: 21
GLUCOSE BLD-MCNC: 117 MG/DL (ref 70–99)
GLUCOSE BLD-MCNC: 119 MG/DL (ref 70–99)
GLUCOSE BLD-MCNC: 136 MG/DL (ref 70–99)
GLUCOSE BLD-MCNC: 97 MG/DL (ref 70–99)
GLUCOSE BLD-MCNC: 99 MG/DL (ref 70–99)
GLUCOSE BLD-MCNC: 99 MG/DL (ref 70–99)
HCT VFR BLD CALC: 27.3 % (ref 36–48)
HEMOGLOBIN: 8.6 G/DL (ref 12–16)
LYMPHOCYTES ABSOLUTE: 1.2 K/UL (ref 1–5.1)
LYMPHOCYTES RELATIVE PERCENT: 15.1 %
MCH RBC QN AUTO: 28.3 PG (ref 26–34)
MCHC RBC AUTO-ENTMCNC: 31.4 G/DL (ref 31–36)
MCV RBC AUTO: 90 FL (ref 80–100)
MONOCYTES ABSOLUTE: 0.7 K/UL (ref 0–1.3)
MONOCYTES RELATIVE PERCENT: 9.6 %
NEUTROPHILS ABSOLUTE: 5.4 K/UL (ref 1.7–7.7)
NEUTROPHILS RELATIVE PERCENT: 69.5 %
PDW BLD-RTO: 20 % (ref 12.4–15.4)
PERFORMED ON: ABNORMAL
PERFORMED ON: NORMAL
PERFORMED ON: NORMAL
PHOSPHORUS: 4.6 MG/DL (ref 2.5–4.9)
PLATELET # BLD: 54 K/UL (ref 135–450)
PMV BLD AUTO: 9.9 FL (ref 5–10.5)
POTASSIUM SERPL-SCNC: 4.2 MMOL/L (ref 3.5–5.1)
RBC # BLD: 3.03 M/UL (ref 4–5.2)
SODIUM BLD-SCNC: 138 MMOL/L (ref 136–145)
WBC # BLD: 7.7 K/UL (ref 4–11)

## 2020-03-30 PROCEDURE — 36415 COLL VENOUS BLD VENIPUNCTURE: CPT

## 2020-03-30 PROCEDURE — 97530 THERAPEUTIC ACTIVITIES: CPT

## 2020-03-30 PROCEDURE — 1200000000 HC SEMI PRIVATE

## 2020-03-30 PROCEDURE — 6360000002 HC RX W HCPCS: Performed by: INTERNAL MEDICINE

## 2020-03-30 PROCEDURE — 6370000000 HC RX 637 (ALT 250 FOR IP): Performed by: STUDENT IN AN ORGANIZED HEALTH CARE EDUCATION/TRAINING PROGRAM

## 2020-03-30 PROCEDURE — 97110 THERAPEUTIC EXERCISES: CPT

## 2020-03-30 PROCEDURE — 74230 X-RAY XM SWLNG FUNCJ C+: CPT

## 2020-03-30 PROCEDURE — 2580000003 HC RX 258: Performed by: STUDENT IN AN ORGANIZED HEALTH CARE EDUCATION/TRAINING PROGRAM

## 2020-03-30 PROCEDURE — 85025 COMPLETE CBC W/AUTO DIFF WBC: CPT

## 2020-03-30 PROCEDURE — 92611 MOTION FLUOROSCOPY/SWALLOW: CPT

## 2020-03-30 PROCEDURE — 99233 SBSQ HOSP IP/OBS HIGH 50: CPT | Performed by: INTERNAL MEDICINE

## 2020-03-30 PROCEDURE — C9113 INJ PANTOPRAZOLE SODIUM, VIA: HCPCS | Performed by: INTERNAL MEDICINE

## 2020-03-30 PROCEDURE — 92610 EVALUATE SWALLOWING FUNCTION: CPT

## 2020-03-30 PROCEDURE — 80069 RENAL FUNCTION PANEL: CPT

## 2020-03-30 PROCEDURE — 97167 OT EVAL HIGH COMPLEX 60 MIN: CPT

## 2020-03-30 PROCEDURE — 97162 PT EVAL MOD COMPLEX 30 MIN: CPT

## 2020-03-30 PROCEDURE — 6360000002 HC RX W HCPCS: Performed by: STUDENT IN AN ORGANIZED HEALTH CARE EDUCATION/TRAINING PROGRAM

## 2020-03-30 PROCEDURE — 6370000000 HC RX 637 (ALT 250 FOR IP): Performed by: INTERNAL MEDICINE

## 2020-03-30 RX ORDER — ALBUTEROL SULFATE 2.5 MG/3ML
2.5 SOLUTION RESPIRATORY (INHALATION) EVERY 6 HOURS PRN
Status: DISCONTINUED | OUTPATIENT
Start: 2020-03-30 | End: 2020-03-31

## 2020-03-30 RX ORDER — DIMETHICONE, CAMPHOR (SYNTHETIC), MENTHOL, AND PHENOL 1.1; .5; .625; .5 G/100G; G/100G; G/100G; G/100G
OINTMENT TOPICAL PRN
Status: DISCONTINUED | OUTPATIENT
Start: 2020-03-30 | End: 2020-04-03 | Stop reason: HOSPADM

## 2020-03-30 RX ADMIN — MICONAZOLE NITRATE: 20 POWDER TOPICAL at 21:14

## 2020-03-30 RX ADMIN — CHLORHEXIDINE GLUCONATE 0.12% ORAL RINSE 15 ML: 1.2 LIQUID ORAL at 21:13

## 2020-03-30 RX ADMIN — Medication 10 ML: at 10:59

## 2020-03-30 RX ADMIN — PANTOPRAZOLE SODIUM 40 MG: 40 INJECTION, POWDER, FOR SOLUTION INTRAVENOUS at 10:58

## 2020-03-30 RX ADMIN — CHLORHEXIDINE GLUCONATE 0.12% ORAL RINSE 15 ML: 1.2 LIQUID ORAL at 10:57

## 2020-03-30 RX ADMIN — POLYETHYLENE GLYCOL (3350) 17 G: 17 POWDER, FOR SOLUTION ORAL at 21:12

## 2020-03-30 RX ADMIN — Medication: at 21:13

## 2020-03-30 RX ADMIN — ACETAMINOPHEN 650 MG: 325 TABLET ORAL at 11:25

## 2020-03-30 RX ADMIN — ATORVASTATIN CALCIUM 80 MG: 80 TABLET, FILM COATED ORAL at 21:12

## 2020-03-30 RX ADMIN — MICONAZOLE NITRATE: 20 POWDER TOPICAL at 10:58

## 2020-03-30 RX ADMIN — INSULIN GLARGINE 8 UNITS: 100 INJECTION, SOLUTION SUBCUTANEOUS at 23:58

## 2020-03-30 RX ADMIN — Medication: at 10:59

## 2020-03-30 RX ADMIN — MEROPENEM 500 MG: 500 INJECTION, POWDER, FOR SOLUTION INTRAVENOUS at 23:49

## 2020-03-30 RX ADMIN — Medication 10 ML: at 21:12

## 2020-03-30 ASSESSMENT — ENCOUNTER SYMPTOMS
EYES NEGATIVE: 1
GASTROINTESTINAL NEGATIVE: 1
DIARRHEA: 0
COUGH: 0
STRIDOR: 0
RESPIRATORY NEGATIVE: 1
WHEEZING: 0
CHOKING: 0
SHORTNESS OF BREATH: 0
NAUSEA: 0
VOMITING: 0
ABDOMINAL DISTENTION: 0

## 2020-03-30 ASSESSMENT — PAIN SCALES - GENERAL
PAINLEVEL_OUTOF10: 0
PAINLEVEL_OUTOF10: 4
PAINLEVEL_OUTOF10: 0

## 2020-03-30 NOTE — PROGRESS NOTES
Nightly  bisacodyl (DULCOLAX) EC tablet 5 mg, 5 mg, Oral, Daily PRN  miconazole (MICOTIN) 2 % powder, , Topical, BID  polyethylene glycol (GLYCOLAX) packet 17 g, 17 g, Oral, Nightly  sodium chloride flush 0.9 % injection 10 mL, 10 mL, Intravenous, 2 times per day  sodium chloride flush 0.9 % injection 10 mL, 10 mL, Intravenous, PRN  acetaminophen (TYLENOL) tablet 650 mg, 650 mg, Oral, Q6H PRN **OR** acetaminophen (TYLENOL) suppository 650 mg, 650 mg, Rectal, Q6H PRN  promethazine (PHENERGAN) tablet 12.5 mg, 12.5 mg, Oral, Q6H PRN **OR** ondansetron (ZOFRAN) injection 4 mg, 4 mg, Intravenous, Q6H PRN  glucose (GLUTOSE) 40 % oral gel 15 g, 15 g, Oral, PRN  dextrose 50 % IV solution, 12.5 g, Intravenous, PRN  glucagon (rDNA) injection 1 mg, 1 mg, Intramuscular, PRN  dextrose 5 % solution, 100 mL/hr, Intravenous, PRN  insulin glargine (LANTUS;BASAGLAR) injection pen 8 Units, 8 Units, Subcutaneous, Nightly  insulin lispro (1 Unit Dial) 0-12 Units, 0-12 Units, Subcutaneous, TID WC  meropenem (MERREM) 500 mg IVPB (mini-bag), 500 mg, Intravenous, Q24H  albuterol sulfate  (90 Base) MCG/ACT inhaler 2 puff, 2 puff, Inhalation, Q6H PRN  heparin (porcine) injection 4,500 Units, 4,500 Units, Intracatheter, PRN  propofol injection, 10 mcg/kg/min, Intravenous, Titrated       Vitals :     Vitals:    03/30/20 0427   BP: 131/79   Pulse: 91   Resp: 24   Temp: 97.5 °F (36.4 °C)   SpO2: 98%       I & O :       Intake/Output Summary (Last 24 hours) at 3/30/2020 0804  Last data filed at 3/30/2020 0644  Gross per 24 hour   Intake 1313 ml   Output 400 ml   Net 913 ml        Physical Examination :     General appearance: awake and alert    HEENT: Lips- normal, teeth- ok , oral mucosa- moist  Neck : Mass- no, appears symmetrical, JVD- not visible  Respiratory:  No dyspmea   Cardiovascular:  Ausculation- No M/R/G, Edema none  Abdomen: visible mass- no, distention- no, scar- no, tenderness- no                            hepatosplenomegaly- no  Musculoskeletal:  clubbing no,cyanosis- no , digital ischemia- no                         Skin: rashes- no , ulcers- no, induration- no, tightening - no  Psychiatric: Not obtained  Additional finding: HD catheter     LABS:     Recent Labs     03/28/20  0351 03/29/20  0509 03/29/20  1001 03/29/20  1525   WBC 8.3 7.2 6.6  --    HGB 7.3* 6.8* 6.7* 8.3*   HCT 23.5* 21.3* 21.6* 26.2*   PLT 53* 27* 38*  --      Recent Labs     03/28/20  0351 03/29/20  0509   * 133*   K 4.0 4.0   CL 96* 98*   CO2 24 25   BUN 23* 15   CREATININE 2.1* 1.7*   GLUCOSE 135* 75   PHOS 3.5 3.3      Nephrology  Shant[address] Cast[address]eda [address], [address]  Office: [phone]  Cell: [phone]  Fax: [phone]

## 2020-03-30 NOTE — DISCHARGE INSTR - COC
Continuity of Care Form    Patient Name: Brianna Martel   :  1940  MRN:  [id number]    Admit date:  3/26/2020  Discharge date:  ***    Code Status Order: Full Code   Advance Directives:   Advance Care Flowsheet Documentation     Date/Time Healthcare Directive Type of Healthcare Directive Copy in [address] Agent's Name Healthcare Agent's Phone Number    20   Yes, patient has an advance directive for healthcare treatment  Durable power of  for health care  No, copy requested from family  [facility] power of   --  [phone]          Admitting Physician:  No admitting provider for patient encounter. PCP: Shilo Neely    Discharging Nurse: [facility] Unit/Room#: 9548/1054-25  Discharging Unit Phone Number: ***    Emergency Contact:   Extended Emergency Contact Information  Primary Emergency Contact: [address] Phone: [phone]  Relation: Child    Past Surgical History:  Past Surgical History:   Procedure Laterality Date    BREAST SURGERY Right 10/1981    MASTECTOMY WITH IMPLANT    CHOLECYSTECTOMY      COLONOSCOPY      FOOT SURGERY Right     ORIF    HYSTERECTOMY         Immunization History: There is no immunization history on file for this patient.     Active Problems:  Patient Active Problem List   Diagnosis Code    Other and unspecified hyperlipidemia E78.5    Essential hypertension, benign I10    Coronary atherosclerosis of native coronary artery I25.10    Aortic valve disorder I35.9    Congestive heart failure ([facility] I50.9    Asthma J45.909    Sleep apnea G47.30    Sepsis ([facility] A41.9    Pneumonia due to organism J18.9    Encounter for observation for suspected exposure to other biological agents ruled out Z03.818    ESRD on hemodialysis ([facility] N18.6, Z99.2    Acute respiratory failure with hypoxia and hypercapnia (HCC) J96.01, J96.02       Isolation/Infection: 1313 [I.V.:550; NG/GT:763]  Out: 400 [Urine:400]    Safety Concerns:     Aspiration Risk    Impairments/Disabilities:      {[facility] Impairments/Disabilities:[id number]}    Nutrition Therapy:  Current Nutrition Therapy:   - Tube Feedings:  Renal    Routes of Feeding: Gastrostomy Tube  Liquids: No Liquids  Daily Fluid Restriction: no  Last Modified Barium Swallow with Video (Video Swallowing Test): not done    Treatments at the Time of Hospital Discharge:   Respiratory Treatments: N/A  Oxygen Therapy:  non-rebreather  Ventilator:    - No ventilator support    Rehab Therapies: N/A  Weight Bearing Status/Restrictions: No weight bearing restirctions  Other Medical Equipment (for information only, NOT a DME order):  hospital bed  Other Treatments: n/a    Patient's personal belongings (please select all that are sent with patient):  None    RN SIGNATURE:  Electronically signed by Ramona Pepper RN on 4/3/20 at 4:59 PM EDT    CASE MANAGEMENT/SOCIAL WORK SECTION    Inpatient Status Date: ***    Readmission Risk Assessment Score:  Readmission Risk              Risk of Unplanned Readmission:        23           Discharging to Facility/ Agency   · Name: [facility]  · Address:  · Phone: [phone]  · Fax:    Dialysis Facility (if applicable)   · Name: [facility]  · Address:  · Dialysis Schedule:  · Phone:  · Fax:    / signature: {\[facility]\""gnature:[id number]}    PHYSICIAN SECTION    Prognosis: Poor    Condition at Discharge: Terminal    Rehab Potential (if transferring to Rehab): Poor    Recommended Labs or Other Treatments After Discharge:     Physician Certification: I certify the above information and transfer of Cyrus Bruno  is necessary for the continuing treatment of the diagnosis listed and that she requires Hospice for less 30 days.      Update Admission H&P: No change in H&P    PHYSICIAN SIGNATURE:  Electronically signed by Genoveva Puente MD on 4/3/20 at 5:14 PM EDT

## 2020-03-30 NOTE — CARE COORDINATION
Case Management Assessment           Daily Note                 Date/ Time of Note: 3/30/2020 11:23 AM         Note completed by: Shazia Cardona    Patient Name: Orlando Atkins  YOB: 1940    Diagnosis:Sepsis ([facility] [A41.9]  Sepsis ([facility] [address] [A41.9]  Patient Admission Status: Inpatient    Date of Admission:3/26/2020  9:52 AM Length of Stay: 4 GLOS:        Current Plan of Care: on room air; HD  ________________________________________________________________________________________  PT AM-PAC:   / 24 per last evaluation on:    OT AM-PAC:   / 24 per last evaluation on:     DME Needs for discharge:     ________________________________________________________________________________________  Discharge Plan: return to [facility] and continue HD at [facility]    Tentative discharge date: TBD    Current barriers to discharge:     Referrals completed:     Resources/ information provided:   ________________________________________________________________________________________  Case Management Notes:  SW called Admissions at [address] [place] [address] is from their skilled unit and can return once medically stable for DC. Pt goes to [facility] for HD. SW will follow. Radha Taruns and her family were provided with choice of provider; she and her family are in agreement with the discharge plan.     Care Transition Patient: Aracelis Cardona [facility]  Case Management Department  Ph: [phone]

## 2020-03-30 NOTE — CONSULTS
NUTRITION ASSESSMENT  Admission Date: 3/26/2020     Type and Reason for Visit: Reassess, Consult    NUTRITION RECOMMENDATIONS:   · Modify TF regimen: Renal formula \"Nepro\" initiate at 20 mL/hr and increase in 4 hrs if tolerating to goal 40 mL/hr  · Water bolus 50 mL Q 4 hrs; minimum water flush 30 mL Q 4 hrs for tube maintenance.  Ensure head of bed is 30-45 degrees during feeding   Monitor for tolerance (bowel habits, N/V, abdominal distention)   Monitor SLP evaluation and recommendations. NUTRITION ASSESSMENT:  Pt is at nutritional compromise r/t NPO status and PEG in place for nutrition. Pt also has increased nutritional needs r/t wounds (stage 2 PU) & ESRD on HD. Pt was extubated 3/28 & TF started via PEG on 3/29: Glucerna @ 40 mL/hr. Pt is tolerating TF at goal per RN. Consult for TF ordering and management, will modify TF regimen to renal formula r/t ESRD and decrease water flushes r/t 1000 mL fluid restriction per nephrology. SLP is consulted however unable to assess d/t pt mental status. Continue TF to meet 100% of needs and continue to monitor SLP ability to evaluate/ diet recommendations       MALNUTRITION ASSESSMENT  Context: Acute illness or injury   Malnutrition Status:  At risk for malnutrition  Findings of the 6 clinical characteristics of malnutrition (Minimum of 2 out of 6 clinical characteristics is required to make the diagnosis of moderate or severe Protein Calorie Malnutrition based on AND/ASPEN Guidelines):  Energy Intake %: Less than or equal to 50% of estimated energy requirement  Energy Intake Time: Unable to assess(x1d)  Interpretation of Weight Loss %: No significant weight loss  Interpretation of Weight Loss Time: in 1 week(noted likely fluid related changes since admit)  Body Fat Status: Unable to assess  Muscle Mass Status: Unable to assess  Fluid Accumulation Status: Unable to assess  Reduced  Strength: Not measured    NUTRITION DIAGNOSIS  Problem: Inadequate Oral

## 2020-03-30 NOTE — PROGRESS NOTES
Occupational Therapy   Occupational Therapy Initial Assessment and Treatment  Late Entry for 1138  Date: 3/30/2020   Patient Name: Orlando Atkins  MRN: [id number]     : 1940    Date of Service: 3/30/2020    Discharge Recommendations:  Orlando Atkins scored a 6/24 on the AM-PAC ADL Inpatient form. Current research shows that an AM-PAC score of 17 or less is typically not associated with a discharge to the patient's home setting. Based on the patients AM-PAC score and their current ADL deficits, it is recommended that the patient have 3-5 sessions per week of Occupational Therapy at d/c to increase the patients independence. If patient discharges prior to next session this note will serve as a discharge summary. Please see below for the latest assessment towards goals. OT Equipment Recommendations  Equipment Needed: No  Other: defer DME recommendations to discharge facility    Assessment   Performance deficits / Impairments: Decreased functional mobility ; Decreased ADL status; Decreased strength;Decreased endurance;Decreased coordination;Decreased ROM  Assessment: Per CM, pt skilled at [facility]. Per conversation w/ nurse at facility, nurse reports pt has been in hospital much of past several months and has not yet really begun working with therapy. Pt reports that she was living with her son several months ago. Currently, pt with limited ROM BUEs and demo inability to perform simple grooming or feeding tasks - grimacing in pain w/ AAROM BUEs. Bed mobility assessed today - requiring assist of 2 persons for rolling and repositioning. Will continue to follow and address ROM and mobility as tolerated. Continue per POC.   Treatment Diagnosis: impaired AROM, impaired ADLs and implied impaired mobility  Decision Making: High Complexity  OT Education: OT Role;Plan of Care  REQUIRES OT FOLLOW UP: Yes  Activity Tolerance  Activity Tolerance: Patient limited by fatigue;Patient limited by pain  Safety

## 2020-03-30 NOTE — PROGRESS NOTES
Progress Note  PGY-3    Hospital Day: 5                                                           Code:Full Code  Admit Date: 3/26/2020                                             PCP: Александр Starr                                SUBJECTIVE:     Chief Complaint   Patient presents with    Shortness of Breath     came from dialysis with asthma exacerbation       Interval Hx:     No acute events overnight. Transferred out of ICU yesterday. Doing well, now on room air and tolerating well x 15 min. Mentation significantly improved, ANOx3, unclear about what happened to bring her to hospital. Feels fine, no complaints this am. Denies CP, SOB, cough, phlegm, fever, n/v/d, chills. Lives with her son, states that she eats normally at home, things she got the feeding tube when she had a trach at some point but is not clear about the details. MEDICATIONS:   Scheduled Meds:   sodium chloride  20 mL Intravenous Once    [START ON 4/2/2020] darbepoetin mateo-polysorbate  200 mcg Intravenous Weekly - Thursday    chlorhexidine  15 mL Mouth/Throat BID    pantoprazole  40 mg Intravenous Daily    Venelex   Topical BID    atorvastatin  80 mg Oral Nightly    miconazole   Topical BID    polyethylene glycol  17 g Oral Nightly    sodium chloride flush  10 mL Intravenous 2 times per day    insulin glargine  8 Units Subcutaneous Nightly    insulin lispro  0-12 Units Subcutaneous TID WC    meropenem  500 mg Intravenous Q24H      Continuous Infusions:   dextrose      propofol Stopped (03/28/20 1536)     PRN Meds:medicated lip ointment, bisacodyl, sodium chloride flush, acetaminophen **OR** acetaminophen, promethazine **OR** ondansetron, glucose, dextrose, glucagon (rDNA), dextrose, albuterol sulfate HFA, heparin (porcine)    Allergies:    Allergies   Allergen Reactions    Dye [Iodides]     Glimepiride     Shellfish-Derived Products     Sulfa Antibiotics     Sulfacetamide        PHYSICAL EXAM:       Vitals: BP 72 hours. Lipids: No results for input(s): CHOL, HDL in the last 72 hours. Invalid input(s): LDLCALCU    U/A:No results for input(s): NITRITE, COLORU, PHUR, LABCAST, WBCUA, RBCUA, MUCUS, TRICHOMONAS, YEAST, BACTERIA, CLARITYU, SPECGRAV, LEUKOCYTESUR, UROBILINOGEN, BILIRUBINUR, BLOODU, GLUCOSEU, AMORPHOUS in the last 72 hours. Invalid input(s): Krisnha Hof     Micro:    Flu, resp panel, MRSA DNA nasal probe negative  Blood cx negative    ASSESSMENT AND PLAN:   Neil Kunz is a 78 y.o. female with PMH of ESRD on HD, CAD, severe malnutrition, chronic dCHF who was admitted with sepsis 2/2 PNA.     AMS 2/2 sepsis 2/2 PNA vs pulm abscess (prior imaging, obtaining images), cefepime encephalopathy also on DDx  - cont merrem,, AMS improved significantly after stopping cefepime  - may need PO abx vs merrem w HD on DC  - COVID-19 negative    RML collapse - mucus plugs, s/p bronch 3/28   - pulm following, on RA now, oxygenation significantly improved    Thrombocytopenia  - in setting of sepsis, improving    AoC anemia in setting of ESRD, stable, no acute loss  - Hb stable around baseline of 8    ESRD on HD  - HD per nephro Dr Vince Linares    Chronic dHF (G1DD on 2016 ECHO, not mentioned in 2019 ECHO)  - stable, not in exac    IDDM2  - Glu well controlled this am  - cont current insulin reg  - accuchecks  - hypoglycemic protocol    CAD s/p cath wout PCI 10/7/2016  - cont statin    Severe malnutrition POA  - [facility]    Stage 2 decub  - wound care    Hx C diff colitis      Code Status:Full Code  FEN: TFs, SLP eval pending for PO  PPX:  SCDs given thrombocytopenia  DISPO: GMF, PT/OT pending    Will discuss with attending physician,  Neno Garcia MD   -----------------------------  Angi Phelps M.D. PGY-3, 9:27 AM

## 2020-03-30 NOTE — PROCEDURES
3/26/2020    Behavior/Cognition/Vision/Hearing:  Behavior/Cognition: Alert; Cooperative    Impressions:  Swallow WFL's. Swallow is timely and efficient. No penetration/aspiration or pharyngeal residue identified. Mastication of solids is adequate with effective clearance of oral cavity. Recommend regular diet/thin liquids - pt states she consumes regular diet texture at home    Treatment Dx and ICD 10: dysphagia   Patient Position: Lateral and Patient Degrees: 90  Consistencies Administered: Reg solid; Dysphagia Pureed (Dysphagia I); Thin teaspoon; Thin straw; Thin cup  Dysphagia Outcome Severity Scale: Level 7: Normal in all situations  Penetration-Aspiration Scale (PAS): 1 - Material does not enter the airway    Recommended Diet:  Solid consistency: Regular  Liquid consistency: Thin  Liquid administration via: Cup;Straw  Medication administration: Meds in puree    Safe Swallow Protocol:  Supervision: 1:1 - pt states she can not feed herself  Compensatory Swallowing Strategies:   Assist feed  Upright as possible for all oral intake    Recommendations/Treatment  Requires SLP Intervention: No  D/C Recommendations: (no follow up indicated)    Recommended Exercises: n/a   Therapeutic Interventions: Patient/Family education    Education: Images and recommendations were reviewed with pt following this exam.   Patient Education: pt educated to results of MBS  Patient Education Response: Verbalizes understanding    Prognosis for safe diet advancement: good  Barriers to reach goals: history of dysphagia  Duration/Frequency of Treatment: no follow up indicated    Goals:    1- The patient will tolerate instrumental swallowing procedure  3/30: goal met  2-The patient/caregiver will demonstrate understanding of compensatory strategies for improved swallowing safety. 3/30: pt educated to results of MBS and recommendations.  Pt stated understanding  Goal met    Oral Preparation / Oral Phase  Oral Phase: WFL    Pharyngeal

## 2020-03-30 NOTE — PROGRESS NOTES
Speech Language Pathology  Facility/Department: [id number] SWALLOW EVALUATION    NAME: Alicia Baires  : 1940  MRN: [id number]    ADMISSION DATE: 3/26/2020  ADMITTING DIAGNOSIS: has Other and unspecified hyperlipidemia; Essential hypertension, benign; Coronary atherosclerosis of native coronary artery; Aortic valve disorder; Congestive heart failure ([address] Asthma; Sleep apnea; Sepsis ([address] Pneumonia due to organism; Encounter for observation for suspected exposure to other biological agents ruled out; ESRD on hemodialysis ([address] and Acute respiratory failure with hypoxia and hypercapnia ([address] on their problem list.  ONSET DATE:  3/26/3030    Recent Chest Xray 3/26/2020  ET tube tip at the thoracic inlet.       Bilateral airspace disease and pleural effusions         CT of Chest 3/26/2020  Occlusion of the right middle lobe bronchus with associated collapse of the right middle lobe.     Interval increase in bilateral nodular and groundglass opacities. This is nonspecific, however, may reflect infectious or inflammatory etiologies, particularly given the increased from the prior study. Areas of septal thickening are also noted, with    which can be seen in the setting of edema.       Moderate bilateral pleural effusions. Date of Eval: 3/30/2020  Evaluating Therapist: Charleen Dale    Current Diet level:  Current Diet : NPO  Current Liquid Diet : NPO    Primary Complaint  Patient Complaint: pt states she consumes regular diet at home with no difficulty    Pain:  Pain Assessment  Pain Assessment: denies    Reason for Referral  Alicia Baires was referred for a bedside swallow evaluation to assess the efficiency of her swallow function, identify signs and symptoms of aspiration and make recommendations regarding safe dietary consistencies, effective compensatory strategies, and safe eating environment.     HISTORY OF PRESENT ILLNESS:   Per MD notes:   \" Mrs. Bobo Frame is an 77yo female PMH CAD, Chronic Diastolic CHF, ESRD on HD, COPD, HTN, HLD, coming from HD with SOB. Patient was just discharged 1 day prior to presenting to [facility] with complaints of shortness of breath. She was recently admitted (3/11/2020-3/20/2020) for treatment of pyelonephritis and was treated with vancomycin and Merrem. At [facility] ED, labs showed increased leukocytosis of 24K and elevated troponin to 0.51. CT chest showed occlusion of RML bronchus likely due to mucous plugging with associated atelectatic collapse and small to moderate pleural effusion. There was concern for sepsis/lung abscess or post obstructive PNA. Patient's guardian reportedly requested transfer to [facility] for further evaluation and treatment. Patient currently states her breathing is much better. Denies shortness of breath, chest pain, fever, chills, nausea, emesis or abdominal pain. Complains of pain in her buttock area, rated 4-5/10 in severity. Pain is improved with position changes and pain meds. \"      Impression  Dysphagia Diagnosis: Suspected needs further assessment  Dysphagia Impression : pt alert, followed simple commands and answered simple questions accurately. Pt intubated  3/26-3/28/2020. Voice is weak, but pt states it is baseline. Pt states she eats a regular diet at home, is not able to state why she had a trach in the past.  Most recent MBS was at [facility], 2/21/2020, where penetration of thin occurred, no aspiration. Pt presented with ice chips, water via tsp/cup and straw, puree and solid. No overt signs of aspiration emerged, voice remained clear. Swallow movement felt upon palpation of anterior neck. Pt demonstrated prolonged mastication with solids, but cleared oral cavity. Due to pt history of dysphagia, recent intubation and respiratory issues, recommend MBS. Dysphagia Outcome Severity Scale: Level 5: Mild dysphagia- Distant supervision.  May need one diet consistency restricted

## 2020-03-31 ENCOUNTER — APPOINTMENT (OUTPATIENT)
Dept: GENERAL RADIOLOGY | Age: 80
DRG: 871 | End: 2020-03-31
Payer: MEDICARE

## 2020-03-31 LAB
ALBUMIN SERPL-MCNC: 3 G/DL (ref 3.4–5)
ANION GAP SERPL CALCULATED.3IONS-SCNC: 12 MMOL/L (ref 3–16)
BASE EXCESS ARTERIAL: -1 (ref -3–3)
BASE EXCESS ARTERIAL: 1.5 MMOL/L (ref -3–3)
BASOPHILS ABSOLUTE: 0 K/UL (ref 0–0.2)
BASOPHILS RELATIVE PERCENT: 0.4 %
BUN BLDV-MCNC: 27 MG/DL (ref 7–20)
CALCIUM IONIZED: 1.38 MMOL/L (ref 1.12–1.32)
CALCIUM SERPL-MCNC: 10 MG/DL (ref 8.3–10.6)
CARBOXYHEMOGLOBIN ARTERIAL: 3.2 % (ref 0–1.5)
CHLORIDE BLD-SCNC: 97 MMOL/L (ref 99–110)
CO2: 26 MMOL/L (ref 21–32)
CREAT SERPL-MCNC: 2.4 MG/DL (ref 0.6–1.2)
EKG ATRIAL RATE: 122 BPM
EKG DIAGNOSIS: NORMAL
EKG P AXIS: 58 DEGREES
EKG P-R INTERVAL: 160 MS
EKG Q-T INTERVAL: 318 MS
EKG QRS DURATION: 130 MS
EKG QTC CALCULATION (BAZETT): 453 MS
EKG R AXIS: 14 DEGREES
EKG T AXIS: 99 DEGREES
EKG VENTRICULAR RATE: 122 BPM
EOSINOPHILS ABSOLUTE: 0.4 K/UL (ref 0–0.6)
EOSINOPHILS RELATIVE PERCENT: 4.9 %
GFR AFRICAN AMERICAN: 23
GFR NON-AFRICAN AMERICAN: 19
GLUCOSE BLD-MCNC: 161 MG/DL (ref 70–99)
GLUCOSE BLD-MCNC: 176 MG/DL (ref 70–99)
GLUCOSE BLD-MCNC: 183 MG/DL (ref 70–99)
GLUCOSE BLD-MCNC: 197 MG/DL (ref 70–99)
GLUCOSE BLD-MCNC: 204 MG/DL (ref 70–99)
GLUCOSE BLD-MCNC: 244 MG/DL (ref 70–99)
GLUCOSE BLD-MCNC: 248 MG/DL (ref 70–99)
HCO3 ARTERIAL: 25 MMOL/L (ref 21–29)
HCO3 ARTERIAL: 25 MMOL/L (ref 21–29)
HCT VFR BLD CALC: 27.3 % (ref 36–48)
HEMOGLOBIN, ART, EXTENDED: 8.6 G/DL
HEMOGLOBIN: 8.6 G/DL (ref 12–16)
LACTATE: 2.12 MMOL/L (ref 0.4–2)
LACTIC ACID, SEPSIS: 2.4 MMOL/L (ref 0.4–1.9)
LV EF: 43 %
LVEF MODALITY: NORMAL
LYMPHOCYTES ABSOLUTE: 0.9 K/UL (ref 1–5.1)
LYMPHOCYTES RELATIVE PERCENT: 11.9 %
MCH RBC QN AUTO: 28.5 PG (ref 26–34)
MCHC RBC AUTO-ENTMCNC: 31.5 G/DL (ref 31–36)
MCV RBC AUTO: 90.6 FL (ref 80–100)
METHEMOGLOBIN ARTERIAL: 0 % (ref 0–1.4)
MONOCYTES ABSOLUTE: 0.7 K/UL (ref 0–1.3)
MONOCYTES RELATIVE PERCENT: 9.1 %
NEUTROPHILS ABSOLUTE: 5.5 K/UL (ref 1.7–7.7)
NEUTROPHILS RELATIVE PERCENT: 73.7 %
O2 SAT, ARTERIAL: 100 % (ref 93–100)
O2 SAT, ARTERIAL: 84 % (ref 93–100)
PCO2 ARTERIAL: 39.3 MMHG (ref 35–45)
PCO2 ARTERIAL: 49.4 MM HG (ref 35–45)
PDW BLD-RTO: 19.5 % (ref 12.4–15.4)
PERFORMED ON: ABNORMAL
PH ARTERIAL: 7.31 (ref 7.35–7.45)
PH ARTERIAL: 7.43 (ref 7.35–7.45)
PHOSPHORUS: 4.9 MG/DL (ref 2.5–4.9)
PLATELET # BLD: 76 K/UL (ref 135–450)
PMV BLD AUTO: 9.3 FL (ref 5–10.5)
PO2 ARTERIAL: 223 MMHG (ref 75–108)
PO2 ARTERIAL: 53.9 MM HG (ref 75–108)
POC POTASSIUM: 4.6 MMOL/L (ref 3.5–5.1)
POC SAMPLE TYPE: ABNORMAL
POC SODIUM: 136 MMOL/L (ref 136–145)
POTASSIUM SERPL-SCNC: 4.2 MMOL/L (ref 3.5–5.1)
PRO-BNP: ABNORMAL PG/ML (ref 0–449)
RBC # BLD: 3.01 M/UL (ref 4–5.2)
SODIUM BLD-SCNC: 135 MMOL/L (ref 136–145)
TCO2 ARTERIAL: 27 MMOL/L
TCO2 ARTERIAL: 27 MMOL/L
TROPONIN: 0.33 NG/ML
WBC # BLD: 7.4 K/UL (ref 4–11)

## 2020-03-31 PROCEDURE — 2700000000 HC OXYGEN THERAPY PER DAY

## 2020-03-31 PROCEDURE — 2580000003 HC RX 258: Performed by: STUDENT IN AN ORGANIZED HEALTH CARE EDUCATION/TRAINING PROGRAM

## 2020-03-31 PROCEDURE — 31500 INSERT EMERGENCY AIRWAY: CPT | Performed by: INTERNAL MEDICINE

## 2020-03-31 PROCEDURE — 6360000002 HC RX W HCPCS: Performed by: STUDENT IN AN ORGANIZED HEALTH CARE EDUCATION/TRAINING PROGRAM

## 2020-03-31 PROCEDURE — 2000000000 HC ICU R&B

## 2020-03-31 PROCEDURE — 0BH17EZ INSERTION OF ENDOTRACHEAL AIRWAY INTO TRACHEA, VIA NATURAL OR ARTIFICIAL OPENING: ICD-10-PCS | Performed by: INTERNAL MEDICINE

## 2020-03-31 PROCEDURE — 84484 ASSAY OF TROPONIN QUANT: CPT

## 2020-03-31 PROCEDURE — 94640 AIRWAY INHALATION TREATMENT: CPT

## 2020-03-31 PROCEDURE — 5A1945Z RESPIRATORY VENTILATION, 24-96 CONSECUTIVE HOURS: ICD-10-PCS | Performed by: INTERNAL MEDICINE

## 2020-03-31 PROCEDURE — C9113 INJ PANTOPRAZOLE SODIUM, VIA: HCPCS | Performed by: INTERNAL MEDICINE

## 2020-03-31 PROCEDURE — 82803 BLOOD GASES ANY COMBINATION: CPT

## 2020-03-31 PROCEDURE — 2500000003 HC RX 250 WO HCPCS: Performed by: STUDENT IN AN ORGANIZED HEALTH CARE EDUCATION/TRAINING PROGRAM

## 2020-03-31 PROCEDURE — 93010 ELECTROCARDIOGRAM REPORT: CPT | Performed by: INTERNAL MEDICINE

## 2020-03-31 PROCEDURE — 36600 WITHDRAWAL OF ARTERIAL BLOOD: CPT

## 2020-03-31 PROCEDURE — 94761 N-INVAS EAR/PLS OXIMETRY MLT: CPT

## 2020-03-31 PROCEDURE — 6360000002 HC RX W HCPCS: Performed by: INTERNAL MEDICINE

## 2020-03-31 PROCEDURE — 2500000003 HC RX 250 WO HCPCS

## 2020-03-31 PROCEDURE — 93005 ELECTROCARDIOGRAM TRACING: CPT | Performed by: INTERNAL MEDICINE

## 2020-03-31 PROCEDURE — 94770 HC ETCO2 MONITOR DAILY: CPT

## 2020-03-31 PROCEDURE — 71045 X-RAY EXAM CHEST 1 VIEW: CPT

## 2020-03-31 PROCEDURE — 94660 CPAP INITIATION&MGMT: CPT

## 2020-03-31 PROCEDURE — 6360000002 HC RX W HCPCS

## 2020-03-31 PROCEDURE — 87040 BLOOD CULTURE FOR BACTERIA: CPT

## 2020-03-31 PROCEDURE — 99291 CRITICAL CARE FIRST HOUR: CPT | Performed by: INTERNAL MEDICINE

## 2020-03-31 PROCEDURE — 6370000000 HC RX 637 (ALT 250 FOR IP): Performed by: STUDENT IN AN ORGANIZED HEALTH CARE EDUCATION/TRAINING PROGRAM

## 2020-03-31 PROCEDURE — 97110 THERAPEUTIC EXERCISES: CPT

## 2020-03-31 PROCEDURE — 84295 ASSAY OF SERUM SODIUM: CPT

## 2020-03-31 PROCEDURE — 83605 ASSAY OF LACTIC ACID: CPT

## 2020-03-31 PROCEDURE — 82947 ASSAY GLUCOSE BLOOD QUANT: CPT

## 2020-03-31 PROCEDURE — 36415 COLL VENOUS BLD VENIPUNCTURE: CPT

## 2020-03-31 PROCEDURE — 85025 COMPLETE CBC W/AUTO DIFF WBC: CPT

## 2020-03-31 PROCEDURE — 83880 ASSAY OF NATRIURETIC PEPTIDE: CPT

## 2020-03-31 PROCEDURE — 84132 ASSAY OF SERUM POTASSIUM: CPT

## 2020-03-31 PROCEDURE — 93306 TTE W/DOPPLER COMPLETE: CPT

## 2020-03-31 PROCEDURE — 6370000000 HC RX 637 (ALT 250 FOR IP): Performed by: INTERNAL MEDICINE

## 2020-03-31 PROCEDURE — 97530 THERAPEUTIC ACTIVITIES: CPT

## 2020-03-31 PROCEDURE — 31500 INSERT EMERGENCY AIRWAY: CPT

## 2020-03-31 PROCEDURE — 80069 RENAL FUNCTION PANEL: CPT

## 2020-03-31 PROCEDURE — 82330 ASSAY OF CALCIUM: CPT

## 2020-03-31 PROCEDURE — 90935 HEMODIALYSIS ONE EVALUATION: CPT

## 2020-03-31 RX ORDER — DIPHENHYDRAMINE HYDROCHLORIDE 50 MG/ML
INJECTION INTRAMUSCULAR; INTRAVENOUS
Status: COMPLETED
Start: 2020-03-31 | End: 2020-03-31

## 2020-03-31 RX ORDER — MIDAZOLAM HYDROCHLORIDE 5 MG/ML
INJECTION INTRAMUSCULAR; INTRAVENOUS
Status: COMPLETED | OUTPATIENT
Start: 2020-03-31 | End: 2020-03-31

## 2020-03-31 RX ORDER — ALBUTEROL SULFATE 2.5 MG/3ML
2.5 SOLUTION RESPIRATORY (INHALATION) EVERY 4 HOURS
Status: DISCONTINUED | OUTPATIENT
Start: 2020-03-31 | End: 2020-04-03

## 2020-03-31 RX ORDER — MIDAZOLAM HYDROCHLORIDE 1 MG/ML
INJECTION INTRAMUSCULAR; INTRAVENOUS
Status: COMPLETED
Start: 2020-03-31 | End: 2020-03-31

## 2020-03-31 RX ORDER — MORPHINE SULFATE 2 MG/ML
INJECTION, SOLUTION INTRAMUSCULAR; INTRAVENOUS
Status: COMPLETED | OUTPATIENT
Start: 2020-03-31 | End: 2020-03-31

## 2020-03-31 RX ORDER — METOPROLOL TARTRATE 5 MG/5ML
INJECTION INTRAVENOUS
Status: COMPLETED
Start: 2020-03-31 | End: 2020-03-31

## 2020-03-31 RX ORDER — METOPROLOL TARTRATE 5 MG/5ML
INJECTION INTRAVENOUS
Status: COMPLETED | OUTPATIENT
Start: 2020-03-31 | End: 2020-03-31

## 2020-03-31 RX ORDER — METHYLPREDNISOLONE SODIUM SUCCINATE 40 MG/ML
INJECTION, POWDER, LYOPHILIZED, FOR SOLUTION INTRAMUSCULAR; INTRAVENOUS
Status: COMPLETED | OUTPATIENT
Start: 2020-03-31 | End: 2020-03-31

## 2020-03-31 RX ORDER — DIPHENHYDRAMINE HYDROCHLORIDE 50 MG/ML
25 INJECTION INTRAMUSCULAR; INTRAVENOUS
Status: DISCONTINUED | OUTPATIENT
Start: 2020-04-02 | End: 2020-04-03

## 2020-03-31 RX ORDER — FENTANYL CITRATE 50 UG/ML
INJECTION, SOLUTION INTRAMUSCULAR; INTRAVENOUS
Status: COMPLETED
Start: 2020-03-31 | End: 2020-03-31

## 2020-03-31 RX ORDER — METHYLPREDNISOLONE SODIUM SUCCINATE 125 MG/2ML
INJECTION, POWDER, LYOPHILIZED, FOR SOLUTION INTRAMUSCULAR; INTRAVENOUS
Status: COMPLETED
Start: 2020-03-31 | End: 2020-03-31

## 2020-03-31 RX ORDER — HYDRALAZINE HYDROCHLORIDE 20 MG/ML
5 INJECTION INTRAMUSCULAR; INTRAVENOUS EVERY 6 HOURS PRN
Status: DISCONTINUED | OUTPATIENT
Start: 2020-03-31 | End: 2020-04-03 | Stop reason: HOSPADM

## 2020-03-31 RX ORDER — FENTANYL CITRATE 50 UG/ML
INJECTION, SOLUTION INTRAMUSCULAR; INTRAVENOUS
Status: COMPLETED | OUTPATIENT
Start: 2020-03-31 | End: 2020-03-31

## 2020-03-31 RX ORDER — MORPHINE SULFATE 2 MG/ML
INJECTION, SOLUTION INTRAMUSCULAR; INTRAVENOUS
Status: COMPLETED
Start: 2020-03-31 | End: 2020-03-31

## 2020-03-31 RX ADMIN — Medication: at 21:04

## 2020-03-31 RX ADMIN — Medication 125 MG: at 09:42

## 2020-03-31 RX ADMIN — Medication 10 ML: at 21:00

## 2020-03-31 RX ADMIN — METHYLPREDNISOLONE SODIUM SUCCINATE: 125 INJECTION, POWDER, FOR SOLUTION INTRAMUSCULAR; INTRAVENOUS at 12:12

## 2020-03-31 RX ADMIN — DIPHENHYDRAMINE HYDROCHLORIDE: 50 INJECTION INTRAMUSCULAR; INTRAVENOUS at 10:00

## 2020-03-31 RX ADMIN — MICONAZOLE NITRATE: 20 POWDER TOPICAL at 12:15

## 2020-03-31 RX ADMIN — MEROPENEM 500 MG: 500 INJECTION, POWDER, FOR SOLUTION INTRAVENOUS at 21:01

## 2020-03-31 RX ADMIN — Medication 10 ML: at 12:14

## 2020-03-31 RX ADMIN — POLYETHYLENE GLYCOL (3350) 17 G: 17 POWDER, FOR SOLUTION ORAL at 21:01

## 2020-03-31 RX ADMIN — PANTOPRAZOLE SODIUM 40 MG: 40 INJECTION, POWDER, FOR SOLUTION INTRAVENOUS at 12:25

## 2020-03-31 RX ADMIN — METOPROLOL TARTRATE: 5 INJECTION, SOLUTION INTRAVENOUS at 12:12

## 2020-03-31 RX ADMIN — METOPROLOL TARTRATE 2.5 MG: 5 INJECTION, SOLUTION INTRAVENOUS at 09:33

## 2020-03-31 RX ADMIN — FENTANYL CITRATE 100 MCG: 50 INJECTION INTRAMUSCULAR; INTRAVENOUS at 10:10

## 2020-03-31 RX ADMIN — FENTANYL CITRATE: 50 INJECTION, SOLUTION INTRAMUSCULAR; INTRAVENOUS at 12:10

## 2020-03-31 RX ADMIN — INSULIN GLARGINE 8 UNITS: 100 INJECTION, SOLUTION SUBCUTANEOUS at 21:00

## 2020-03-31 RX ADMIN — DEXMEDETOMIDINE 0.2 MCG/KG/HR: 100 INJECTION, SOLUTION, CONCENTRATE INTRAVENOUS at 14:41

## 2020-03-31 RX ADMIN — ATORVASTATIN CALCIUM 80 MG: 80 TABLET, FILM COATED ORAL at 21:01

## 2020-03-31 RX ADMIN — MORPHINE SULFATE: 2 INJECTION, SOLUTION INTRAMUSCULAR; INTRAVENOUS at 12:13

## 2020-03-31 RX ADMIN — CHLORHEXIDINE GLUCONATE 0.12% ORAL RINSE 15 ML: 1.2 LIQUID ORAL at 21:00

## 2020-03-31 RX ADMIN — ALBUTEROL SULFATE 2.5 MG: 2.5 SOLUTION RESPIRATORY (INHALATION) at 19:40

## 2020-03-31 RX ADMIN — INSULIN LISPRO 4 UNITS: 100 INJECTION, SOLUTION INTRAVENOUS; SUBCUTANEOUS at 18:09

## 2020-03-31 RX ADMIN — MIDAZOLAM HYDROCHLORIDE 50 MG: 5 INJECTION, SOLUTION INTRAMUSCULAR; INTRAVENOUS at 09:52

## 2020-03-31 RX ADMIN — MICONAZOLE NITRATE: 20 POWDER TOPICAL at 21:04

## 2020-03-31 RX ADMIN — MIDAZOLAM HYDROCHLORIDE: 2 INJECTION, SOLUTION INTRAMUSCULAR; INTRAVENOUS at 12:12

## 2020-03-31 RX ADMIN — MIDAZOLAM HYDROCHLORIDE 5 MG: 5 INJECTION, SOLUTION INTRAMUSCULAR; INTRAVENOUS at 10:07

## 2020-03-31 RX ADMIN — ALBUTEROL SULFATE 2.5 MG: 2.5 SOLUTION RESPIRATORY (INHALATION) at 16:59

## 2020-03-31 RX ADMIN — Medication: at 12:15

## 2020-03-31 RX ADMIN — DIPHENHYDRAMINE HYDROCHLORIDE: 50 INJECTION, SOLUTION INTRAMUSCULAR; INTRAVENOUS at 10:00

## 2020-03-31 RX ADMIN — HYDRALAZINE HYDROCHLORIDE 5 MG: 20 INJECTION INTRAMUSCULAR; INTRAVENOUS at 18:18

## 2020-03-31 RX ADMIN — METOPROLOL TARTRATE 2.5 MG: 5 INJECTION, SOLUTION INTRAVENOUS at 09:36

## 2020-03-31 RX ADMIN — INSULIN LISPRO 4 UNITS: 100 INJECTION, SOLUTION INTRAVENOUS; SUBCUTANEOUS at 12:53

## 2020-03-31 RX ADMIN — MORPHINE SULFATE 1 MG: 2 INJECTION, SOLUTION INTRAMUSCULAR; INTRAVENOUS at 09:40

## 2020-03-31 RX ADMIN — FENTANYL CITRATE 100 MCG: 50 INJECTION INTRAMUSCULAR; INTRAVENOUS at 10:04

## 2020-03-31 RX ADMIN — CHLORHEXIDINE GLUCONATE 0.12% ORAL RINSE 15 ML: 1.2 LIQUID ORAL at 12:15

## 2020-03-31 RX ADMIN — EPINEPHRINE 0.25 MG: 0.1 INJECTION, SOLUTION ENDOTRACHEAL; INTRACARDIAC; INTRAVENOUS at 16:19

## 2020-03-31 RX ADMIN — ALBUTEROL SULFATE 2.5 MG: 2.5 SOLUTION RESPIRATORY (INHALATION) at 13:54

## 2020-03-31 ASSESSMENT — PAIN SCALES - GENERAL
PAINLEVEL_OUTOF10: 0
PAINLEVEL_OUTOF10: 0

## 2020-03-31 ASSESSMENT — PULMONARY FUNCTION TESTS
PIF_VALUE: 27
PIF_VALUE: 23
PIF_VALUE: 27
PIF_VALUE: 22
PIF_VALUE: 26
PIF_VALUE: 24
PIF_VALUE: 26
PIF_VALUE: 24
PIF_VALUE: 26
PIF_VALUE: 22

## 2020-03-31 NOTE — PROGRESS NOTES
Occupational Therapy  Daily Treatment Note  Patient Name: Briseyda Crum  MRN: [id number]    Discharge Recommendations: Briseyda Crum scored a 6/24 on the AM-PAC ADL Inpatient form. Current research shows that an AM-PAC score of 17 or less is typically not associated with a discharge to the patient's home setting. Based on the patients AM-PAC score and their current ADL deficits, it is recommended that the patient have 3-5 sessions per week of Occupational Therapy at d/c to increase the patients independence. Assessment: Pt continues to be limited by significant fatigue/soreness with all ROM activities. Encouragement given to participate in functional tasks. Pt reports her goal is to return home eventually. Plans to return to SNF at d/c. Equipment Needs:  Defer to SNF       Chart Reviewed: Yes       Other position/activity restrictions: bedrest with bathroom privileges     Additional Pertinent Hx: Admit 3/26 from facility with SOB, tachycardia, intubated 3/26, extub 3/28, bronchoscopy 3/28; chest CT: Occlusion of the right middle lobe bronchus with associated collapse of the right middle lobe; COVID negative; palliative care involved; PMHx: hospitalized 3/11 - 3/20/20; DM, HTN, COPD, CHF, CAD, CKD, R foot surgery, R mastectomy, PEG tube      Diagnosis: Sepsis     Treatment Diagnosis: impaired AROM, impaired ADLs and implied impaired mobility    Subjective: Pt found supine, agreeable to OT. Pain: Denies, appears mildly uncomfortable with exercises           Objective:    Cognition/Orientation: Slow to respond at times, A&O x3    Bed mobility   Rolling: Max A x1; to each direction, L and R- 2 trials each  Scooting: Dependent x2 to scoot up in bed     ADLs   LB dressing: Dependent  Toileting: Incontinent of urine, pure wick in place but appeared dislodged. Total assist for toyin care via rolling in bed.  Pt asking about cream to place on buttocks due to soreness- RN present to apply Venelex    UE Exercises: AAROM- BUE shoulder flexion, elbow flexion, wrist flexion, grasp/release x5 reps each; effortful to achieve full range. Significant tightness noted     Activity Tolerance: Limited by fatigue      Patient Education: Importance of activity/participation in self care tasks- pt will need reinforcement    Safety Devices in Place: Left in bed with alarm on, needs in reach, RN present          Therapy Time:   Individual Concurrent Group Co-treatment   Time In 0820         Time Out  0843         Minutes  23           Timed Code Treatment Minutes:  23    Total Treatment Minutes:  23      Goals:  Short term goals  Time Frame for Short term goals: Discharge  Short term goal 1: increase AROM to [facility] for self feeding w/ setup - not met  Short term goal 2: tolerate 1 set of 5 BUE AAROM all planes - not met  Short term goal 3: wash face w/ setup and Mod A - not met         Plan:      Times per week: 2-5   Times per day: Daily    If patient is discharged prior to next treatment, this note will serve as the discharge summary.       Marylee Fiddler, OT

## 2020-03-31 NOTE — PROGRESS NOTES
RESPIRATORY THERAPY ASSESSMENT    Name:  Mitch Jose Record Number:  [id number]  Age: 78 y.o. Gender: female  : 1940  Today's Date:  3/31/2020  Room:  [facility]/9447-42    Assessment     Is the patient being admitted for a COPD or Asthma exacerbation? No   (If yes the patient will be seen every 4 hours for the first 24 hours and then reassessed)    Patient Admission Diagnosis sepsis and pneumonia      Allergies  Allergies   Allergen Reactions    Dye [Iodides]     Glimepiride     Shellfish-Derived Products     Sulfa Antibiotics     Sulfacetamide            Pulmonary History:Asthma  Home Oxygen Therapy:  room air  Home Respiratory Therapy:Albuterol and Albuterol/Ipratropium Bromide HHN   Current Respiratory Therapy:  Albuterol HHN Q4h PRN  Treatment Type: Aerosol generator  Medications: Albuterol    Respiratory Severity Index(RSI)   Patients with orders for inhalation medications, oxygen, or any therapeutic treatment modality will be placed on Respiratory Protocol. They will be assessed with the first treatment and at least every 72 hours thereafter. The following severity scale will be used to determine frequency of treatment intervention.     Smoking History: Pulmonary Disease or Smoking History, Greater than 15 pack year = 2    Social History  Social History     Tobacco Use    Smoking status: Never Smoker    Smokeless tobacco: Never Used   Substance Use Topics    Alcohol use: No    Drug use: No       Recent Surgical History: None = 0  Past Surgical History  Past Surgical History:   Procedure Laterality Date    BREAST SURGERY Right 10/1981    MASTECTOMY WITH IMPLANT    CHOLECYSTECTOMY      COLONOSCOPY      FOOT SURGERY Right     ORIF    HYSTERECTOMY         Level of Consciousness: Lethargic = 3    Level of Activity: Bedridden, unresponsive or quadriplegic = 4    Respiratory Pattern: Use of accessory muscles;prolonged expiration; or RR greater than 30 = 3    Breath Sounds: Diminshed bilaterally and/or crackles = 2    Sputum  Sputum Color: Cloudy, Tenacity: Thick, Sputum How Obtained: Endotracheal  Cough: Strong, spontaneous, non-productive = 0    Vital Signs   BP (!) 87/47   Pulse 69   Temp 98.4 °F (36.9 °C) (Oral)   Resp 16   Ht 5' 4\" (1.626 m)   Wt 171 lb 1.2 oz (77.6 kg)   SpO2 100%   BMI 29.37 kg/m²   SPO2 (COPD values may differ): Less than 86% on room air or greater than 92% on FiO2 greater than 50% = 4    Peak Flow (asthma only): not applicable = 0    RSI: 57-03 = Q4 (every four hours)        Plan       Goals: medication delivery    Patient/caregiver was educated on the proper method of use for Respiratory Care Devices:  No:      Level of patient/caregiver understanding able to:   ? Verbalize understanding   ? Demonstrate understanding       ? Teach back        ? Needs reinforcement       ? No available caregiver               ? Other:     Response to education:  unable     Is patient being placed on Home Treatment Regimen? NA     Does the patient have everything they need prior to discharge? NA     Comments: Patient reintubated today and placed on ventilator. Plan of Care: Albuterol HHN Q4h. Electronically signed by Dorian Anderson RCP on 3/31/2020 at 2:14 PM    Respiratory Protocol Guidelines     1. Assessment and treatment by Respiratory Therapy will be initiated for medication and therapeutic interventions upon initiation of aerosolized medication. 2. Physician will be contacted for respiratory rate (RR) greater than 35 breaths per minute. Therapy will be held for heart rate (HR) greater than 140 beats per minute, pending direction from physician. 3. Bronchodilators will be administered via Metered Dose Inhaler (MDI) with spacer when the following criteria are met:  a. Alert and cooperative     b. HR < 140 bpm  c. RR < 30 bpm                d. Can demonstrate a 2-3 second inspiratory hold  4.  Bronchodilators will be administered via Hand Held Nebulizer [facility]EL[facility]) to

## 2020-03-31 NOTE — PROGRESS NOTES
Pulmonary Progress Note    Admit Date: 3/26/2020  Day: 6  Vent Day: None  IV Access:Peripheral  IV Fluids:None  Vasopressors:None                Antibiotics: None  Diet: DIET TUBE FEED CONTINUOUS/CYCLIC NPO; Renal Formula (Nepro); Gastrostomy; Continuous; 20; 40; 24    CC: SOB    Interval history: I was called to a rapid response in dialysis. Per report she had been doing well this morning and actually had been weaned to room air with an oxygen saturation of 92%. After 20 minutes being on treatment she complained of dyspnea. Oxygen saturation was 88% on 1.5 L so oxygen was increased to 3 L. Patient became diaphoretic, tachypneic, and was using sensory muscles to breathe. Rapid response was called and patient's blood rinse back with 400 of normal saline. .    When I arrived she was on 100% nonrebreather and an ABG had just been drawn. She had received Solu-Medrol 125 mg IV push. Her respiratory rate was in the 40s and she was using accessory muscles. Her eyes were open but she was minimally interactive. She had poor air movement throughout both lung fields. I did not appreciate any stridor but I also did not appreciate any crackles. ABG was 7.31/49/54. The last time this happened I was told it was in the setting of hemodialysis. Anaphylaxis was entertained. Her blood pressure was 170 so this was not an anaphylactic shock. . Patient was placed on BiPAP. I gave her 50 mg of Benadryl and 0.25 mg of IV epinephrine empirically for anaphylaxis without any appreciable change. Due to concern for impending respiratory failure the decision was made to intubate her in dialysis. Please see procedure note for that. Dr Dasha Rowland performed an excellent and comprehensive chart review of Rose Marie's medical course going back to June 2019 at multiple facilities.   Below is a pertinent part of her pulmonary history:    02/18 - 02/28/2020 -- admitted at [facility], admitted for worsening renal function, severe azotemia  and XSL7MZC 38.4 49.4*   PO2ART 124.0* 53.9*   VFA3TBB 27 25.0   BEART 3.5* -1   C7RCLNOA 100 84*   [id number] 28 27       Lactate:   Recent Labs     03/31/20  0953   LACTATE 2.12*     Pro-BNP 15,614    Cultures:  -----------------------------------------------------------------  RAD: CXR 3/31  Impression       Worsening pleuroparenchymal consolidation in the right lower lung.       Perihilar interstitial prominence suggesting moderate pulmonary edema. Correlate clinically.       Left basilar pleuroparenchymal consolidation without change.       Cardiomegaly.       Lines and tubes without change.       Right axillary surgical clips noted. FINDINGS:       Lungs and Airways: Similar to the prior study, occlusion of the right middle lobe bronchus is noted with associated volume loss of the right middle lobe. There are nodular and groundglass opacities of the bilateral lung bases which have increased in the    interval. There is increased interstitial thickening also. Groundglass opacities are noted scattered throughout the bilateral upper lobes. There is compressive atelectasis of the bilateral lower lobes.       Pleura: Moderate bilateral pleural effusions are noted.       Heart and Great Vessels: The heart size is stable. Aortic valve stent. There is no pericardial effusion. Atherosclerotic calcification of the aorta and coronaries is noted.       Adenopathy: None.       Chest Wall / Lower Neck: Right breast prosthesis.       Upper Abdomen: No significant abnormality.       Bones: No acute abnormality.           Impression       Occlusion of the right middle lobe bronchus with associated collapse of the right middle lobe.     Interval increase in bilateral nodular and groundglass opacities. This is nonspecific, however, may reflect infectious or inflammatory etiologies, particularly given the increased from the prior study. Areas of septal thickening are also noted, with    which can be seen in the setting of edema.     Moderate bilateral pleural effusions. ECHO    Study Conclusions    - Left ventricle: The cavity size is normal. Wall thickness is    normal. Systolic function was normal. The estimated ejection    fraction was in the range of 55% to 60%. Although no diagnostic    regional wall motion abnormality was identified, this possibility    cannot be completely excluded on the basis of this study. The    study is not technically sufficient to allow evaluation of LV    diastolic function. There is no evidence of a thrombus revealed    by acoustic contrast opacification.  - Aortic valve: There is a bioprosthetic TAVR valve. The peak    systolic velocity is 7.9N/PFV. The mean systolic gradient is 13HT    Hg. The peak velocity and mean gradient are within normal limits    for a bioprosthetic valve. The valve area by the velocity-time    integral method is 1.1cm^2. The valve area by the peak velocity    method is 1.2cm^2. - Mitral valve: Mobility was restricted. The findings are    consistent with severe stenotic physiology at a heart rate of 92    BPM. The valve area is 0.7cm^2. The valve area by pressure    half-time is 2.1cm^2. The valve area (LVOT continuity) is    1.0cm^2. - Right ventricle: Systolic function was normal by objective    interpretation. TAPSE: 2.4cm. Tricuspid annular systolic velocity:    66UB/L.  - Atrial septum: A patent foramen ovale cannot be excluded.    Agitated saline contrast study at baseline or with provocation,    shows no right-to-left atrial level shunt.  - Pulmonary arteries: Systolic pressure could not be accurately    estimated. A/P     Acute respiratory failure requiring intubation and mechanical vent support. Extubated 3/28 and re-intubated emergently today and hemodialysis.   Differential includes flash pulmonary edema (has severe mitral stenosis on echo performed at outside hospital recently) versus allergic reaction to dialysis versus tracheal stenosis (although review of CT

## 2020-03-31 NOTE — CONSULTS
deep or superficial venous thrombosis in the bilateral lower extremities. 3/10/2020 CT Chest ():  1.  Right middle lobe pulmonary abscess measuring up to 4.2 x 2.9 cm.  2.  Interval decrease in widespread groundglass and nodular opacities. 3.  Small left pleural effusion with near complete collapse of the left lower lobe, unchanged. 4.  No residual pneumothorax. 5.  Unchanged ruptured right breast implant. 6.  Increased size of a left lateral breast soft tissue nodule measuring up to 1 cm. Further evaluation with mammographic imaging is recommended. 1/2020 Echo [facility]): EF 55-60%. ? RWMA. TAVR present. PV 2.2. MG is 10. Severe MS (MVA 0.72 but P1/2 time area is 2.1 cm2. Continuity equation is 1.0 cm2).        Current Facility-Administered Medications:     [START ON 4/2/2020] diphenhydrAMINE (BENADRYL) injection 25 mg, 25 mg, Intravenous, Once per day on Tue Thu Sat, Lucía Mayo MD    perflutren lipid microspheres (DEFINITY) injection 1.65 mg, 1.5 mL, Intravenous, ONCE PRN, Bela Escobar MD    dexmedetomidine [facility]) 400 mcg in sodium chloride 0.9 % 100 mL infusion, 0.2 mcg/kg/hr, Intravenous, Continuous, Ada Conde MD, Last Rate: 3.9 mL/hr at 03/31/20 1441, 0.2 mcg/kg/hr at 03/31/20 1441    albuterol (PROVENTIL) nebulizer solution 2.5 mg, 2.5 mg, Nebulization, Q4H, Luis Natarajan MD    medicated lip ointment (BLISTEX), , Topical, PRN, Richi Walker RN    0.9 % sodium chloride bolus, 20 mL, Intravenous, Once, MD Tonia Pope  [START ON 4/2/2020] darbepoetin mateo-polysorbate (ARANESP) injection 200 mcg, 200 mcg, Intravenous, Weekly - Thursday, Avinash García, DO    chlorhexidine (PERIDEX) 0.12 % solution 15 mL, 15 mL, Mouth/Throat, BID, Bela Escobar MD, 15 mL at 03/31/20 1215    pantoprazole (PROTONIX) injection 40 mg, 40 mg, Intravenous, Daily, Bela Escobar MD, 40 mg at 03/31/20 1225    Venelex ointment, , Topical, BID, Lilly Zamudio MD    atorvastatin (LIPITOR) tablet 80 mg,

## 2020-03-31 NOTE — PROGRESS NOTES
Pt transported to Dialysis at [phone] 3/31/20 am. About 9:22 AM, Dialysis called this RN indicating that pt was complaining of difficulty breathing. This RN and Charge, RN went to dialysis to find pt tachycardic to the 140's, breathing rapidly at 40 breaths per minute, and diaphoretic but still alert. Rapid response was called. Pt O2 increased to 15 L of 02. Code team responded. Pt given various medications at direction of code team. Pts mental status began declining during rapid response to point that patient would only respond when name called loudly. ABG drawn by Respiratory therapist and ICU RN. Respiratory applied bipap and ultimately Dr. Jose Antonio Tian made the decision to intubate patient. Pt intubated and transferred to ICU RM [id number]. All personal belongings sent with patient. Report provided to RN in [facility]

## 2020-03-31 NOTE — PROGRESS NOTES
Progress Note  PGY-3    Hospital Day: 6                                                           Code:Full Code  Admit Date: 3/26/2020                                             PCP: Ashlee Scott                                SUBJECTIVE:     Chief Complaint   Patient presents with    Shortness of Breath     came from dialysis with asthma exacerbation       Interval Hx:   Rapid Response called 20 mins into dialysis. Pt SOB and using accessory muslces, Tachy in 550'H, 'M systolic, Sat low 04'E on 10 L HFNC. Diffuse wheezing noted on physical exam.     EKG: sinus tach. ABG 7.312/49/53/25.      Pt given nebulizer treatment and SOB did not improve. Pt then started getting more obtunded and not responding to questions appropriately. Given salumedrol, benadryl,epinephrine for suspected allergic reaction.     SOB, mental status failed to improve despite BiPAP. Pt given versed and fentanyl, and was then intubated by RT. Transferred to ICU for further management.      MEDICATIONS:   Scheduled Meds:   [START ON 4/2/2020] diphenhydrAMINE  25 mg Intravenous Once per day on Tue Thu Sat    sodium chloride  20 mL Intravenous Once    [START ON 4/2/2020] darbepoetin mateo-polysorbate  200 mcg Intravenous Weekly - Thursday    chlorhexidine  15 mL Mouth/Throat BID    pantoprazole  40 mg Intravenous Daily    Venelex   Topical BID    atorvastatin  80 mg Oral Nightly    miconazole   Topical BID    polyethylene glycol  17 g Oral Nightly    sodium chloride flush  10 mL Intravenous 2 times per day    insulin glargine  8 Units Subcutaneous Nightly    insulin lispro  0-12 Units Subcutaneous TID WC    meropenem  500 mg Intravenous Q24H      Continuous Infusions:   dextrose       PRN Meds:perflutren lipid microspheres, medicated lip ointment, albuterol, bisacodyl, sodium chloride flush, acetaminophen **OR** acetaminophen, promethazine **OR** ondansetron, glucose, dextrose, glucagon (rDNA), dextrose, heparin DSV1GVT 38.4 49.4*   PO2ART 124.0* 53.9*     INR: No results for input(s): INR in the last 72 hours. Lipids: No results for input(s): CHOL, HDL in the last 72 hours. Invalid input(s): LDLCALCU    U/A:No results for input(s): NITRITE, COLORU, PHUR, LABCAST, WBCUA, RBCUA, MUCUS, TRICHOMONAS, YEAST, BACTERIA, CLARITYU, SPECGRAV, LEUKOCYTESUR, UROBILINOGEN, BILIRUBINUR, BLOODU, GLUCOSEU, AMORPHOUS in the last 72 hours. Invalid input(s): Amanda Al     Micro:    Flu, resp panel, MRSA DNA nasal probe negative  Blood cx negative    ASSESSMENT AND PLAN:   Fran Gillespie is a 78 y.o. female with PMH of ESRD on HD, CAD, severe malnutrition, chronic dCHF who was admitted with sepsis 2/2 PNA.     AMS 2/2 sepsis 2/2 PNA vs pulm abscess (prior imaging, obtaining images), cefepime encephalopathy also on DDx  - cont merrem,, AMS improved significantly after stopping cefepime  - may need PO abx vs merrem w HD on DC  - COVID-19 negative    RML collapse - mucus plugs, s/p bronch 3/28   - pulm following, on RA now, oxygenation significantly improved    Thrombocytopenia  - in setting of sepsis, improving    AoC anemia in setting of ESRD, stable, no acute loss  - Hb stable around baseline of 8    ESRD on HD  - HD per nephro Dr Andrew Aguilera    Chronic dHF (G1DD on 2016 ECHO, not mentioned in 2019 ECHO)  - stable, not in exac    IDDM2  - Glu well controlled this am  - cont current insulin reg  - accuchecks  - hypoglycemic protocol    CAD s/p cath wout PCI 10/7/2016  - cont statin    Severe malnutrition POA  [facility]    Stage 2 decub  - wound care    Hx C diff colitis      Code Status:Full Code  FEN: TFs, SLP eval pending for PO  PPX:  SCDs given thrombocytopenia  DISPO: GMF, PT/OT pending    Will discuss with attending physician,  Eric Calix MD   -----------------------------  Leanne Leventhal, M.D. PGY-1, 12:28 PM

## 2020-03-31 NOTE — PROGRESS NOTES
Occupational Therapy  Discharge      Chart reviewed. Pt transferred to ICU after rapid response called during dialysis. Will discharge from acute OT at this time due to medical decline. Please re-order as appropriate.          Alley Ledbetter, OTR/L, [id number]

## 2020-03-31 NOTE — CONSULTS
recent diagnosis of misplaced PEG tube. An episode of pyelonephritis treated with vancomycin and meropenem. After prolonged hospital stay, the patient was discharged on 3/051082. At the time when she was discharged, she was doing well. Unfortunately, she re-presented to the [facility] again on 03/21/2020 for worsening of shortness of breath. Over at [facility] Emergency Room, the patient was found have a leukocytosis and elevated troponin level and because of that, the patient has been transferred back to the [facility] for further evaluation treatment.              This patient has very complex and complicated medical problems in recent past. She has a lot of hospitalizations because of a variety of reasons. 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  She also has history of for C diff colitis.        PAST HISTORY:     Past Medical History:   Diagnosis Date    CAD (coronary artery disease)     CHF (congestive heart failure) (HCC)     CKD (chronic kidney disease)     COPD (chronic obstructive pulmonary disease) (HCC)     Hyperlipidemia     Hypertension     Type II or unspecified type diabetes mellitus without mention of complication, not stated as uncontrolled        Past Surgical History:   Procedure Laterality Date    BREAST SURGERY Right 10/1981    MASTECTOMY WITH IMPLANT    CHOLECYSTECTOMY      COLONOSCOPY      FOOT SURGERY Right     ORIF    HYSTERECTOMY         SocialHistory:   The patient lives in a nursing home    Alcohol: denies any history of alcohol abuse  Illicit drugs: denies use  Tobacco:  Never smoked or used smokeless tobacco    Family History:  Family History   Problem Relation Age of Onset    Asthma Mother        MEDICATIONS:     No current facility-administered medications on file prior to encounter. Current Outpatient Medications on File Prior to Encounter   Medication Sig Dispense Refill    ipratropium-albuterol (DUONEB) 0.5-2.5 (3) MG/3ML SOLN nebulizer solution Inhale 1 vial into the lungs every 4 hours as needed for Shortness of Breath      cinacalcet (SENSIPAR) 30 MG tablet Take 30 mg by mouth daily      insulin glargine (BASAGLAR KWIKPEN) 100 UNIT/ML injection pen Inject 8 Units into the skin nightly      ferrous sulfate (IRON 325) 325 (65 Fe) MG tablet Take 325 mg by mouth daily      insulin aspart (NOVOLOG) 100 UNIT/ML injection vial Inject 0-10 Units into the skin every 8 hours Per sliding scale      omeprazole (PRILOSEC) 20 MG delayed release capsule Take 20 mg by mouth daily      amLODIPine (NORVASC) 5 MG tablet Take 5 mg by mouth daily      atorvastatin (LIPITOR) 80 MG tablet Take 80 mg by mouth nightly       bisacodyl (DULCOLAX) 5 MG EC tablet Take 5 mg by mouth daily as needed for Constipation      nystatin (MYCOSTATIN) 287591 UNIT/GM powder Apply topically 2 times daily Apply topically to ABD folds      polyethylene glycol (GLYCOLAX) packet Take 17 g by mouth nightly       aspirin 81 MG tablet Take 81 mg by mouth daily      albuterol (PROVENTIL HFA;VENTOLIN HFA) 108 (90 BASE) MCG/ACT inhaler Inhale 2 puffs into the lungs every 6 hours as needed.              Scheduled Meds:   [START ON 4/2/2020] diphenhydrAMINE  25 mg Intravenous Once per day on Tue Thu Sat    sodium chloride  20 mL Intravenous Once    [START ON 4/2/2020] darbepoetin mateo-polysorbate  200 mcg Intravenous Weekly - Thursday    chlorhexidine  15 mL Mouth/Throat BID    pantoprazole  40 mg Intravenous Daily    Venelex   Topical BID    atorvastatin  80 mg Oral Nightly    miconazole   Topical BID    polyethylene glycol  17 g Increased work of breathing. Decreased air movement. Skin:     General: Skin is warm and dry. Coloration: Skin is not jaundiced. Psychiatric:      Comments: Lethargic. Vent Settings: Vent Mode: AC/PRVC Rate Set: 16 bmp/Vt Ordered: 450 mL/ /FiO2 : 100 %    Recent Labs     03/28/20  1420 03/31/20  0953   PHART 7.462* 7.312*   GXH9HCJ 38.4 49.4*   PO2ART 124.0* 53.9*           DATA:       Labs:  CBC:   Recent Labs     03/29/20  1001 03/29/20  1525 03/30/20  0910 03/31/20  0757   WBC 6.6  --  7.7 7.4   HGB 6.7* 8.3* 8.6* 8.6*   HCT 21.6* 26.2* 27.3* 27.3*   PLT 38*  --  54* 76*       BMP:   Recent Labs     03/29/20  0509 03/30/20  0910 03/31/20  0757   * 138 135*   K 4.0 4.2 4.2   CL 98* 101 97*   CO2 25 25 26   BUN 15 22* 27*   CREATININE 1.7* 2.2* 2.4*   GLUCOSE 75 97 176*   PHOS 3.3 4.6 4.9     LFT's: No results for input(s): AST, ALT, ALB, BILITOT, ALKPHOS in the last 72 hours. Troponin:   Recent Labs     03/31/20  0757   TROPONINI 0.33*     BNP:No results for input(s): BNP in the last 72 hours. ABGs:   Recent Labs     03/28/20  1420 03/31/20  0953   PHART 7.462* 7.312*   [id number] 38.4 49.4*   PO2ART 124.0* 53.9*     INR: No results for input(s): INR in the last 72 hours. U/A:No results for input(s): NITRITE, COLORU, PHUR, LABCAST, WBCUA, RBCUA, MUCUS, TRICHOMONAS, YEAST, BACTERIA, CLARITYU, SPECGRAV, LEUKOCYTESUR, UROBILINOGEN, BILIRUBINUR, BLOODU, GLUCOSEU, AMORPHOUS in the last 72 hours. Invalid input(s): KETONESU    XR CHEST PORTABLE   Final Result      Worsening pleuroparenchymal consolidation in the right lower lung. Perihilar interstitial prominence suggesting moderate pulmonary edema. Correlate clinically. Left basilar pleuroparenchymal consolidation without change. Cardiomegaly. Lines and tubes without change. Right axillary surgical clips noted. FL MODIFIED BARIUM SWALLOW W VIDEO   Final Result   1.  Mild oral dysphagia otherwise no penetration or aspiration. XR CHEST PORTABLE   Final Result      ET tube tip at the thoracic inlet. Bilateral airspace disease and pleural effusions            CT CHEST WO CONTRAST   Final Result      Occlusion of the right middle lobe bronchus with associated collapse of the right middle lobe. Interval increase in bilateral nodular and groundglass opacities. This is nonspecific, however, may reflect infectious or inflammatory etiologies, particularly given the increased from the prior study. Areas of septal thickening are also noted, with    which can be seen in the setting of edema. Moderate bilateral pleural effusions. XR CHEST PORTABLE   Final Result      Dense multifocal consolidation involving the right lower lung and left mid/lower lung. Asymmetric pulmonary edema and multifocal pneumonia are considerations. Top normal heart size without change. Right jugular dialysis catheter in standard position. Right axillary surgical clips noted. EKG:   Echo:  Micro:     ASSESSMENT AND PLAN:   Alicia Baires is a 78 y.o. female with PMH of ESRD on HD, CAD, severe malnutrition, chronic dCHF who was admitted with sepsis 2/2 PNA.       Acute hypoxic/hypercapnic respiratory failure 2/2 RML collapse - mucus plugs, s/p bronch 3/28: ABG 7.312/49.4/53.9/25    - Intubated in dialysis  - CXR: Worsening pleuroparenchymal consolidation in the right lower lung. Perihilar interstitial prominence suggesting moderate pulmonary edema. Correlate clinically. Left basilar pleuroparenchymal consolidation without change. Cardiomegaly. Lines and tubes without change. Right axillary surgical clips noted.   - Vent Set: AC/PRVC, Vt 504, RR 16, FiO2 100, PEEP 5  - Trop 0.33  - Repeat ABG 1 hr  - Repeat blood/urine cultures    AMS 2/2 sepsis 2/2 PNA vs pulm abscess (prior imaging, obtaining images), cefepime encephalopathy also on DDx  - cont merrem, AMS improved significantly after stopping cefepime  - COVID-19 negative  - Lact 2.12 -> 2.4   - Trend Lact q2h      Thrombocytopenia  - in setting of sepsis, improving     AoC anemia in setting of ESRD, stable, no acute loss  - Hb stable around baseline of 8     ESRD on HD  - HD per nephro Dr America Estrada     Chronic dHF (G1DD on 2016 ECHO, not mentioned in 2019 ECHO)  - stable, not in exac  - BNP 07237     IDDM2  - Glu well controlled this am  - cont current insulin reg  - accuchecks  - hypoglycemic protocol     CAD s/p cath wout PCI 10/7/2016  - cont statin     Severe malnutrition POA  - TFs dosing per alexandra     Stage 2 decub  - wound care     Hx C diff colitis        Code Status:Full Code  FEN: Tube Feeds  PPX:  Heparin SubQ  DISPO: ICU     This patient has been staffed and discussed with Vernell Garner MD.   -----------------------------  Sugey Phillips MD, PGY-1  3/31/2020  1:59 PM

## 2020-03-31 NOTE — PROGRESS NOTES
CT called and stated patient has a CT of the chest ordered, but is allergic to the contrast. Elisabeth Woods MD aware. No further orders at this time.

## 2020-04-01 LAB
ALBUMIN SERPL-MCNC: 3.1 G/DL (ref 3.4–5)
ANION GAP SERPL CALCULATED.3IONS-SCNC: 14 MMOL/L (ref 3–16)
ANISOCYTOSIS: ABNORMAL
BASOPHILS ABSOLUTE: 0 K/UL (ref 0–0.2)
BASOPHILS RELATIVE PERCENT: 0.3 %
BUN BLDV-MCNC: 34 MG/DL (ref 7–20)
BURR CELLS: ABNORMAL
CALCIUM SERPL-MCNC: 10.9 MG/DL (ref 8.3–10.6)
CHLORIDE BLD-SCNC: 102 MMOL/L (ref 99–110)
CO2: 20 MMOL/L (ref 21–32)
CREAT SERPL-MCNC: 2.4 MG/DL (ref 0.6–1.2)
EOSINOPHILS ABSOLUTE: 0 K/UL (ref 0–0.6)
EOSINOPHILS RELATIVE PERCENT: 0.1 %
GFR AFRICAN AMERICAN: 23
GFR NON-AFRICAN AMERICAN: 19
GLUCOSE BLD-MCNC: 169 MG/DL (ref 70–99)
GLUCOSE BLD-MCNC: 175 MG/DL (ref 70–99)
GLUCOSE BLD-MCNC: 189 MG/DL (ref 70–99)
GLUCOSE BLD-MCNC: 193 MG/DL (ref 70–99)
GLUCOSE BLD-MCNC: 231 MG/DL (ref 70–99)
GLUCOSE BLD-MCNC: 297 MG/DL (ref 70–99)
GLUCOSE BLD-MCNC: 304 MG/DL (ref 70–99)
GLUCOSE BLD-MCNC: 305 MG/DL (ref 70–99)
HCT VFR BLD CALC: 32.3 % (ref 36–48)
HEMOGLOBIN: 10 G/DL (ref 12–16)
LYMPHOCYTES ABSOLUTE: 0.6 K/UL (ref 1–5.1)
LYMPHOCYTES RELATIVE PERCENT: 7 %
MCH RBC QN AUTO: 28.9 PG (ref 26–34)
MCHC RBC AUTO-ENTMCNC: 30.9 G/DL (ref 31–36)
MCV RBC AUTO: 93.5 FL (ref 80–100)
MONOCYTES ABSOLUTE: 0.4 K/UL (ref 0–1.3)
MONOCYTES RELATIVE PERCENT: 4.8 %
NEUTROPHILS ABSOLUTE: 7.2 K/UL (ref 1.7–7.7)
NEUTROPHILS RELATIVE PERCENT: 87.8 %
OVALOCYTES: ABNORMAL
PDW BLD-RTO: 19.7 % (ref 12.4–15.4)
PERFORMED ON: ABNORMAL
PHOSPHORUS: 4.7 MG/DL (ref 2.5–4.9)
PLATELET # BLD: ABNORMAL K/UL (ref 135–450)
PLATELET SLIDE REVIEW: ABNORMAL
PMV BLD AUTO: ABNORMAL FL (ref 5–10.5)
POTASSIUM SERPL-SCNC: 4.9 MMOL/L (ref 3.5–5.1)
RBC # BLD: 3.46 M/UL (ref 4–5.2)
SCHISTOCYTES: ABNORMAL
SODIUM BLD-SCNC: 136 MMOL/L (ref 136–145)
TEAR DROP CELLS: ABNORMAL
WBC # BLD: 8.2 K/UL (ref 4–11)

## 2020-04-01 PROCEDURE — 6360000002 HC RX W HCPCS: Performed by: INTERNAL MEDICINE

## 2020-04-01 PROCEDURE — 6360000002 HC RX W HCPCS: Performed by: STUDENT IN AN ORGANIZED HEALTH CARE EDUCATION/TRAINING PROGRAM

## 2020-04-01 PROCEDURE — 6370000000 HC RX 637 (ALT 250 FOR IP): Performed by: STUDENT IN AN ORGANIZED HEALTH CARE EDUCATION/TRAINING PROGRAM

## 2020-04-01 PROCEDURE — 85025 COMPLETE CBC W/AUTO DIFF WBC: CPT

## 2020-04-01 PROCEDURE — 80069 RENAL FUNCTION PANEL: CPT

## 2020-04-01 PROCEDURE — 6370000000 HC RX 637 (ALT 250 FOR IP): Performed by: INTERNAL MEDICINE

## 2020-04-01 PROCEDURE — P9047 ALBUMIN (HUMAN), 25%, 50ML: HCPCS | Performed by: INTERNAL MEDICINE

## 2020-04-01 PROCEDURE — 36415 COLL VENOUS BLD VENIPUNCTURE: CPT

## 2020-04-01 PROCEDURE — 94640 AIRWAY INHALATION TREATMENT: CPT

## 2020-04-01 PROCEDURE — 2000000000 HC ICU R&B

## 2020-04-01 PROCEDURE — 2700000000 HC OXYGEN THERAPY PER DAY

## 2020-04-01 PROCEDURE — 99233 SBSQ HOSP IP/OBS HIGH 50: CPT | Performed by: INTERNAL MEDICINE

## 2020-04-01 PROCEDURE — 2580000003 HC RX 258: Performed by: STUDENT IN AN ORGANIZED HEALTH CARE EDUCATION/TRAINING PROGRAM

## 2020-04-01 PROCEDURE — C9113 INJ PANTOPRAZOLE SODIUM, VIA: HCPCS | Performed by: INTERNAL MEDICINE

## 2020-04-01 PROCEDURE — 94761 N-INVAS EAR/PLS OXIMETRY MLT: CPT

## 2020-04-01 PROCEDURE — 94770 HC ETCO2 MONITOR DAILY: CPT

## 2020-04-01 PROCEDURE — 94003 VENT MGMT INPAT SUBQ DAY: CPT

## 2020-04-01 PROCEDURE — 94750 HC PULMONARY COMPLIANCE STUDY: CPT

## 2020-04-01 PROCEDURE — 2500000003 HC RX 250 WO HCPCS: Performed by: STUDENT IN AN ORGANIZED HEALTH CARE EDUCATION/TRAINING PROGRAM

## 2020-04-01 PROCEDURE — 99291 CRITICAL CARE FIRST HOUR: CPT | Performed by: INTERNAL MEDICINE

## 2020-04-01 PROCEDURE — 90935 HEMODIALYSIS ONE EVALUATION: CPT

## 2020-04-01 RX ORDER — ALBUMIN (HUMAN) 12.5 G/50ML
12.5 SOLUTION INTRAVENOUS PRN
Status: DISCONTINUED | OUTPATIENT
Start: 2020-04-01 | End: 2020-04-03

## 2020-04-01 RX ORDER — DIPHENHYDRAMINE HYDROCHLORIDE 50 MG/ML
25 INJECTION INTRAMUSCULAR; INTRAVENOUS EVERY 6 HOURS PRN
Status: DISCONTINUED | OUTPATIENT
Start: 2020-04-01 | End: 2020-04-03 | Stop reason: HOSPADM

## 2020-04-01 RX ORDER — ALBUMIN (HUMAN) 12.5 G/50ML
25 SOLUTION INTRAVENOUS ONCE
Status: COMPLETED | OUTPATIENT
Start: 2020-04-01 | End: 2020-04-01

## 2020-04-01 RX ORDER — INSULIN LISPRO 100 [IU]/ML
0-12 INJECTION, SOLUTION INTRAVENOUS; SUBCUTANEOUS EVERY 4 HOURS
Status: DISCONTINUED | OUTPATIENT
Start: 2020-04-01 | End: 2020-04-03

## 2020-04-01 RX ADMIN — MEROPENEM 500 MG: 500 INJECTION, POWDER, FOR SOLUTION INTRAVENOUS at 20:35

## 2020-04-01 RX ADMIN — INSULIN LISPRO 2 UNITS: 100 INJECTION, SOLUTION INTRAVENOUS; SUBCUTANEOUS at 20:22

## 2020-04-01 RX ADMIN — POLYETHYLENE GLYCOL (3350) 17 G: 17 POWDER, FOR SOLUTION ORAL at 20:22

## 2020-04-01 RX ADMIN — ALBUMIN (HUMAN) 25 G: 0.25 INJECTION, SOLUTION INTRAVENOUS at 11:01

## 2020-04-01 RX ADMIN — ALBUTEROL SULFATE 2.5 MG: 2.5 SOLUTION RESPIRATORY (INHALATION) at 19:50

## 2020-04-01 RX ADMIN — Medication: at 20:22

## 2020-04-01 RX ADMIN — ATORVASTATIN CALCIUM 80 MG: 80 TABLET, FILM COATED ORAL at 20:22

## 2020-04-01 RX ADMIN — INSULIN LISPRO 8 UNITS: 100 INJECTION, SOLUTION INTRAVENOUS; SUBCUTANEOUS at 04:13

## 2020-04-01 RX ADMIN — ALBUTEROL SULFATE 2.5 MG: 2.5 SOLUTION RESPIRATORY (INHALATION) at 01:10

## 2020-04-01 RX ADMIN — ALBUMIN (HUMAN) 12.5 G: 0.25 INJECTION, SOLUTION INTRAVENOUS at 12:09

## 2020-04-01 RX ADMIN — MICONAZOLE NITRATE: 20 POWDER TOPICAL at 20:34

## 2020-04-01 RX ADMIN — DIPHENHYDRAMINE HYDROCHLORIDE 25 MG: 50 INJECTION, SOLUTION INTRAMUSCULAR; INTRAVENOUS at 09:48

## 2020-04-01 RX ADMIN — Medication: at 09:15

## 2020-04-01 RX ADMIN — INSULIN LISPRO 2 UNITS: 100 INJECTION, SOLUTION INTRAVENOUS; SUBCUTANEOUS at 13:16

## 2020-04-01 RX ADMIN — DEXMEDETOMIDINE 0.4 MCG/KG/HR: 100 INJECTION, SOLUTION, CONCENTRATE INTRAVENOUS at 23:01

## 2020-04-01 RX ADMIN — Medication 10 ML: at 20:23

## 2020-04-01 RX ADMIN — INSULIN LISPRO 8 UNITS: 100 INJECTION, SOLUTION INTRAVENOUS; SUBCUTANEOUS at 09:15

## 2020-04-01 RX ADMIN — ALBUTEROL SULFATE 2.5 MG: 2.5 SOLUTION RESPIRATORY (INHALATION) at 16:29

## 2020-04-01 RX ADMIN — INSULIN LISPRO 6 UNITS: 100 INJECTION, SOLUTION INTRAVENOUS; SUBCUTANEOUS at 01:25

## 2020-04-01 RX ADMIN — ALBUTEROL SULFATE 2.5 MG: 2.5 SOLUTION RESPIRATORY (INHALATION) at 08:18

## 2020-04-01 RX ADMIN — PANTOPRAZOLE SODIUM 40 MG: 40 INJECTION, POWDER, FOR SOLUTION INTRAVENOUS at 09:14

## 2020-04-01 RX ADMIN — CHLORHEXIDINE GLUCONATE 0.12% ORAL RINSE 15 ML: 1.2 LIQUID ORAL at 19:50

## 2020-04-01 RX ADMIN — CHLORHEXIDINE GLUCONATE 0.12% ORAL RINSE 15 ML: 1.2 LIQUID ORAL at 09:14

## 2020-04-01 RX ADMIN — HEPARIN SODIUM 4500 UNITS: 1000 INJECTION INTRAVENOUS; SUBCUTANEOUS at 13:10

## 2020-04-01 RX ADMIN — Medication 10 ML: at 09:15

## 2020-04-01 RX ADMIN — ALBUTEROL SULFATE 2.5 MG: 2.5 SOLUTION RESPIRATORY (INHALATION) at 05:00

## 2020-04-01 RX ADMIN — INSULIN LISPRO 2 UNITS: 100 INJECTION, SOLUTION INTRAVENOUS; SUBCUTANEOUS at 18:05

## 2020-04-01 RX ADMIN — DEXMEDETOMIDINE 0.3 MCG/KG/HR: 100 INJECTION, SOLUTION, CONCENTRATE INTRAVENOUS at 07:44

## 2020-04-01 RX ADMIN — ALBUTEROL SULFATE 2.5 MG: 2.5 SOLUTION RESPIRATORY (INHALATION) at 12:34

## 2020-04-01 ASSESSMENT — PULMONARY FUNCTION TESTS
PIF_VALUE: 24
PIF_VALUE: 30
PIF_VALUE: 24
PIF_VALUE: 31
PIF_VALUE: 26
PIF_VALUE: 22
PIF_VALUE: 24
PIF_VALUE: 28
PIF_VALUE: 25
PIF_VALUE: 25
PIF_VALUE: 24
PIF_VALUE: 19
PIF_VALUE: 24
PIF_VALUE: 26
PIF_VALUE: 23
PIF_VALUE: 28
PIF_VALUE: 24

## 2020-04-01 ASSESSMENT — PAIN SCALES - GENERAL
PAINLEVEL_OUTOF10: 0

## 2020-04-01 NOTE — PROGRESS NOTES
Hospitalist Progress Note      PCP: Jen Mena    Date of Admission: 3/26/2020    Chief Complaint: shortness of breath    Hospital Course:   Extensive review of prior Hospitalizations -- see resident attestation note from 03/30/2020 03/26 -- presented to [facility] ED form HD unit with complains of sob. CT chest showed occlusion of the right middle lobe bronchus with associated collapse of the right middle lobe and in bilateral non-specific nodular and groundglass opacities, also noted septal thickening.     - ICU stay during this admission -- admitted after HD with HTN, tachycardia and labored breathing and intubated. Continued on broad spectrum abx, cefepime was stopped due to concern for contributing to encephalopathy. Vanc was dc once MRSA nasal probe was negative. She continues to be on Merrem. She was extubated 03/28 afternoon after bronchoscopy which did not show significant secretions, RML was obstructed by granulation tissue. [address] was ruled out with negative testing and she is now out of droplet isolation.     03/31/2020 this morning rapid response called when patient was 20 min into dialysis, patient short of breath, using system was respiration, tachypnea, and low oxygen saturation on high flow nasal cannula. EKG was done which showed sinus tachycardia. Patient was given some nebulizer treatments without improvement. There was a concern of possible allergic reaction to dialysate fluid. Patient was given Solu-Medrol Benadryl epinephrine. With patient being obtunded and no improvement with BiPAP patient was given Versed fentanyl and intubated. Patient was started ICU for further management. Patient was seen in the ICU. She opens eyes when name called, able to follow commands, move all extremities while intubated.     04/01 -- intubated sedated, planned for increased fluid removal with HD today    Medications:  Reviewed    Infusion Medications    dexmedetomidine (PRECEDEX) IV infusion Full range of motion without deformity. Skin: Skin color, texture, turgor normal.  No rashes or lesions. Neurologic:  Neurovascularly intact without any focal sensory/motor deficits. Cranial nerves: II-XII intact, grossly non-focal.  Psychiatric: Alert and oriented, thought content appropriate, normal insight  Capillary Refill: Brisk,< 3 seconds   Peripheral Pulses: +2 palpable, equal bilaterally       Labs:   Recent Labs     03/30/20 0910 03/31/20 0757 04/01/20 0457   WBC 7.7 7.4 8.2   HGB 8.6* 8.6* 10.0*   HCT 27.3* 27.3* 32.3*   PLT 54* 76* see below     Recent Labs     03/30/20 0910 03/31/20 0757 04/01/20 0457    135* 136   K 4.2 4.2 4.9    97* 102   CO2 25 26 20*   BUN 22* 27* 34*   CREATININE 2.2* 2.4* 2.4*   CALCIUM 9.8 10.0 10.9*   PHOS 4.6 4.9 4.7     No results for input(s): AST, ALT, BILIDIR, BILITOT, ALKPHOS in the last 72 hours. No results for input(s): INR in the last 72 hours. Recent Labs     03/31/20 0757   TROPONINI 0.33*       Urinalysis:      Lab Results   Component Value Date    NITRU Negative 03/21/2020    WBCUA 21-50 03/21/2020    BACTERIA 1+ 03/21/2020    RBCUA  03/21/2020    BLOODU LARGE 03/21/2020    SPECGRAV 1.020 03/21/2020    GLUCOSEU Negative 03/21/2020       Radiology:  XR CHEST PORTABLE   Final Result      Worsening pleuroparenchymal consolidation in the right lower lung. Perihilar interstitial prominence suggesting moderate pulmonary edema. Correlate clinically. Left basilar pleuroparenchymal consolidation without change. Cardiomegaly. Lines and tubes without change. Right axillary surgical clips noted. FL MODIFIED BARIUM SWALLOW W VIDEO   Final Result   1. Mild oral dysphagia otherwise no penetration or aspiration. XR CHEST PORTABLE   Final Result      ET tube tip at the thoracic inlet.       Bilateral airspace disease and pleural effusions            CT CHEST WO CONTRAST   Final Result      Occlusion of the right middle

## 2020-04-01 NOTE — PROGRESS NOTES
ICU Progress Note    Admit Date: 3/26/2020  Day: 6  Vent Day: None  IV Access:Peripheral  IV Fluids:None  Vasopressors:None                Antibiotics: None  Diet: DIET TUBE FEED CONTINUOUS/CYCLIC NPO; Renal Formula (Nepro); Gastrostomy; Continuous; 20; 40; 24    CC:   Chief Complaint   Patient presents with    Shortness of Breath     came from dialysis with asthma exacerbation     Interval history: NAEON. Pt on minimal vent settings. Will get 3L off during HD this am. Given Benadryl and steroids prior do to concerns for allergic reaction to dialysate. Sedated on Precedex 0.4, getting tube feeds.      Medications:     Scheduled Meds:   insulin lispro  0-12 Units Subcutaneous Q4H    [START ON 4/2/2020] diphenhydrAMINE  25 mg Intravenous Once per day on Tue Thu Sat    albuterol  2.5 mg Nebulization Q4H    sodium chloride  20 mL Intravenous Once    [START ON 4/2/2020] darbepoetin mateo-polysorbate  200 mcg Intravenous Weekly - Thursday    chlorhexidine  15 mL Mouth/Throat BID    pantoprazole  40 mg Intravenous Daily    Venelex   Topical BID    atorvastatin  80 mg Oral Nightly    miconazole   Topical BID    polyethylene glycol  17 g Oral Nightly    sodium chloride flush  10 mL Intravenous 2 times per day    insulin glargine  8 Units Subcutaneous Nightly    meropenem  500 mg Intravenous Q24H     Continuous Infusions:   dexmedetomidine (PRECEDEX) IV infusion 0.3 mcg/kg/hr (04/01/20 0744)    dextrose       PRN Meds:diphenhydrAMINE, albumin human, perflutren lipid microspheres, hydrALAZINE, medicated lip ointment, bisacodyl, sodium chloride flush, acetaminophen **OR** acetaminophen, promethazine **OR** ondansetron, glucose, dextrose, glucagon (rDNA), dextrose, heparin (porcine)    Objective:   Vitals:   T-max:  Patient Vitals for the past 8 hrs:   BP Temp Temp src Pulse Resp SpO2 Height Weight   04/01/20 1030 (!) 104/55 -- -- 91 14 97 % -- --   04/01/20 1025 (!) 63/45 -- -- 84 12 98 % -- --   04/01/20 1017 85/60 -- -- 93 14 -- -- --   04/01/20 1015 (!) 69/44 -- -- 86 14 98 % -- --   04/01/20 1000 (!) 144/65 -- -- 64 12 100 % -- --   04/01/20 0945 (!) 133/56 -- -- 65 16 100 % -- --   04/01/20 0930 (!) 139/59 -- -- 66 16 100 % -- --   04/01/20 0915 (!) 137/58 97.4 °F (36.3 °C) Axillary 67 16 100 % -- 173 lb 8 oz (78.7 kg)   04/01/20 0900 -- -- -- -- -- 100 % -- --   04/01/20 0845 -- -- -- 67 18 100 % -- --   04/01/20 0830 -- -- -- 66 16 100 % -- --   04/01/20 0818 -- -- -- 64 16 100 % -- --   04/01/20 0815 -- -- -- 77 21 100 % -- --   04/01/20 0800 138/66 -- -- 62 16 100 % -- --   04/01/20 0745 -- -- -- 62 16 100 % -- --   04/01/20 0741 137/63 -- -- 61 16 100 % -- --   04/01/20 0730 -- -- -- 64 16 -- -- --   04/01/20 0715 -- -- -- 61 16 -- -- --   04/01/20 0700 137/63 -- -- 60 16 -- -- --   04/01/20 0600 (!) 122/57 -- -- 68 16 100 % -- --   04/01/20 0500 (!) 142/65 -- -- 70 16 100 % 5' 4\" (1.626 m) --   04/01/20 0400 (!) 158/72 97.7 °F (36.5 °C) -- 70 16 100 % -- --   04/01/20 0300 (!) 158/71 -- -- 72 16 100 % -- --       Intake/Output Summary (Last 24 hours) at 4/1/2020 1052  Last data filed at 4/1/2020 0600  Gross per 24 hour   Intake 973.54 ml   Output 300 ml   Net 673.54 ml       Review of Systems   Unable to perform ROS: Intubated       Physical Exam  Constitutional:       General: She is not in acute distress. Appearance: She is ill-appearing. She is not toxic-appearing or diaphoretic. Comments: Sedated but arousable. HENT:      Head: Normocephalic and atraumatic. Eyes:      General: No scleral icterus. Right eye: No discharge. Left eye: No discharge. Conjunctiva/sclera: Conjunctivae normal.   Cardiovascular:      Rate and Rhythm: Normal rate and regular rhythm. Heart sounds: No murmur. No friction rub. No gallop. Pulmonary:      Effort: No respiratory distress. Breath sounds: No stridor. No wheezing. Comments: Intubated. Chest:      Chest wall: No tenderness. Abdominal:      General: Bowel sounds are normal. There is no distension. Palpations: Abdomen is soft. There is no mass. Tenderness: There is no abdominal tenderness. Musculoskeletal:         General: No deformity or signs of injury. Right lower leg: No edema. Left lower leg: No edema. Skin:     General: Skin is warm and dry. Coloration: Skin is not jaundiced. LABS:    CBC:   Recent Labs     03/30/20  0910 03/31/20 0757 04/01/20 0457   WBC 7.7 7.4 8.2   HGB 8.6* 8.6* 10.0*   HCT 27.3* 27.3* 32.3*   PLT 54* 76* see below   MCV 90.0 90.6 93.5     Renal:    Recent Labs     03/30/20  0910 03/31/20 0757 04/01/20 0457    135* 136   K 4.2 4.2 4.9    97* 102   CO2 25 26 20*   BUN 22* 27* 34*   CREATININE 2.2* 2.4* 2.4*   GLUCOSE 97 176* 304*   CALCIUM 9.8 10.0 10.9*   PHOS 4.6 4.9 4.7   ANIONGAP 12 12 14     Hepatic:   Recent Labs     03/30/20  0910 03/31/20 0757 04/01/20 0457   LABALBU 2.8* 3.0* 3.1*     Troponin:   Recent Labs     03/31/20  0757   TROPONINI 0.33*     BNP: No results for input(s): BNP in the last 72 hours. Lipids: No results for input(s): CHOL, HDL in the last 72 hours. Invalid input(s): LDLCALCU, TRIGLYCERIDE  ABGs:    Recent Labs     03/31/20  0953 03/31/20  1520   PHART 7.312* 7.426   KFD2GFN 49.4* 39.3   PO2ART 53.9* 223.0*   ADH6ZYR 25.0 25   BEART -1 1.5   X3LYROAU 84* 100   KLV2GFA 27 27       INR: No results for input(s): INR in the last 72 hours. Lactate:   Recent Labs     03/31/20  0953   LACTATE 2.12*     Cultures:  -----------------------------------------------------------------  RAD:   XR CHEST PORTABLE   Final Result      Worsening pleuroparenchymal consolidation in the right lower lung. Perihilar interstitial prominence suggesting moderate pulmonary edema. Correlate clinically. Left basilar pleuroparenchymal consolidation without change. Cardiomegaly. Lines and tubes without change.       Right axillary encephalopathy also on DDx  - cont merrem, AMS improved significantly after stopping cefepime  - COVID-19 negative  - Lact 2.12 -> 2.4       Thrombocytopenia  - in setting of sepsis, improving     AoC anemia in setting of ESRD, stable, no acute loss  - Hb stable around baseline of 8     ESRD on HD  - HD per nephro Dr Montero Nearing     Chronic dHF (G1DD on 2016 ECHO, not mentioned in 2019 ECHO)  - stable, not in exac  - BNP 82521     IDDM2  - Lantus 12 u nightly  - MDSSI  - accuchecks  - hypoglycemic protocol     CAD s/p cath wout PCI 10/7/2016  - cont statin     Severe malnutrition POA  - TFs dosing per alexandra     Stage 2 decub  - wound care     Hx C diff colitis    Code Status:  FEN:   PPX:   DISPO:     Renetta Xavier MD, PGY-1  04/01/20  10:52 AM    This patient has been staffed and discussed with Elayne Van MD    Patient seen, examined and discussed with the resident and I agree with the assessment and plan. 78 y.o. female with ESRD who had abrupt hypoxemic and hypercapnic respiratory failure yesterday when she started dialysis yesterday. No evidence of tracheal stenosis on CT. Blood gas and acidemia rapidly corrected with intubation. May be a reaction to her dialysate. Will get a repeat ABG after her dialysis today and if she does well will pursue and SBT. Spoke with cardiology and they do not believe she has clinically significant mitral valve stenosis. Critical care time spent reviewing labs/films, examining patient, collaborating with other physicians but excluding procedures for life threatening organ failure is 40 minutes.       Roberto Alamo MD

## 2020-04-01 NOTE — PROGRESS NOTES
Palliative Care Chart Review  and Check in Note:     NAME:  Joleen Mattson Date: 3/26/2020  Hospital Day:  Hospital Day: 7   Current Code status: Full Code    Palliative care is continuing to following Ms. Daryl Mora for symptom management,  and goals of care discussion as needed. Patient's chart reviewed today 4/1/20. Pt is alert, no apparent distress. Called son Irina Bruno and had a brief conversation, offered to update him and he said he's been updated, and expressed appreciation for the care his mother is receiving. D/w ICU residents. The following are the currently established goals/code status, and Symptom management. Goals of care: Continue dialysis and aggressive care. Code status: Full Code    Discharge plan:  Hopefully can return to [place] when medically ready    Yovanny Segal NP  [address] [facility]

## 2020-04-01 NOTE — PROGRESS NOTES
a week ago. She went for dialysis today and 1 hour into dialysis became extremely short of breath. She was sent to the ER for further evaluation. In the ER she was found to have leukocytosis with a troponin of 0.51. CT chest showed occlusion of right middle lobe bronchus due to mucous plugging and associated collapse. Rapid flu test was negative. Interval History:     Events were noted from yesterday. She is now intubated and in ICU. Please schedule for dialysis again today. ROS:     Seen with-no family in the room. She is intubated. PMH/PSH/SH/Family History:     Past Medical History:   Diagnosis Date    CAD (coronary artery disease)     CHF (congestive heart failure) (HCC)     CKD (chronic kidney disease)     COPD (chronic obstructive pulmonary disease) (HCC)     Hyperlipidemia     Hypertension     Type II or unspecified type diabetes mellitus without mention of complication, not stated as uncontrolled        Past Surgical History:   Procedure Laterality Date    BREAST SURGERY Right 10/1981    MASTECTOMY WITH IMPLANT    CHOLECYSTECTOMY      COLONOSCOPY      FOOT SURGERY Right     ORIF    HYSTERECTOMY          reports that she has never smoked. She has never used smokeless tobacco. She reports that she does not drink alcohol or use drugs. family history includes Asthma in her mother.          Medication:     Current Facility-Administered Medications: insulin lispro (1 Unit Dial) 0-12 Units, 0-12 Units, Subcutaneous, Q4H  [START ON 4/2/2020] diphenhydrAMINE (BENADRYL) injection 25 mg, 25 mg, Intravenous, Once per day on Tue Thu Sat  perflutren lipid microspheres (DEFINITY) injection 1.65 mg, 1.5 mL, Intravenous, ONCE PRN  dexmedetomidine (PRECEDEX) 400 mcg in sodium chloride 0.9 % 100 mL infusion, 0.2 mcg/kg/hr, Intravenous, Continuous  albuterol (PROVENTIL) nebulizer solution 2.5 mg, 2.5 mg, Nebulization, Q4H  hydrALAZINE (APRESOLINE) injection 5 mg, 5 mg, Intravenous, Q6H

## 2020-04-01 NOTE — PROGRESS NOTES
injection 25 mg, 25 mg, Intravenous, Q6H PRN, Karyna Hines MD, 25 mg at 04/01/20 0948    albumin human 25 % IV solution 12.5 g, 12.5 g, Intravenous, PRN, Karyna Hines MD    albumin human 25 % IV solution 25 g, 25 g, Intravenous, Once, Karyna Hines MD, Last Rate: 100 mL/hr at 04/01/20 1101, 25 g at 04/01/20 1101    insulin glargine (LANTUS;BASAGLAR) injection pen 12 Units, 12 Units, Subcutaneous, Nightly, MD Parviz Erwin   Chroman ON 4/2/2020] diphenhydrAMINE (BENADRYL) injection 25 mg, 25 mg, Intravenous, Once per day on Tue Thu Sat, Karyna Hines MD    perflutren lipid microspheres (DEFINITY) injection 1.65 mg, 1.5 mL, Intravenous, ONCE PRN, Jose Castano MD    dexmedetomidine (PRECEDEX) 400 mcg in sodium chloride 0.9 % 100 mL infusion, 0.2 mcg/kg/hr, Intravenous, Continuous, Ada Conde MD, Last Rate: 5.8 mL/hr at 04/01/20 0744, 0.3 mcg/kg/hr at 04/01/20 0744    albuterol (PROVENTIL) nebulizer solution 2.5 mg, 2.5 mg, Nebulization, Q4H, Adam Fang MD, 2.5 mg at 04/01/20 0818    hydrALAZINE (APRESOLINE) injection 5 mg, 5 mg, Intravenous, Q6H PRN, Kathi Fenton MD, 5 mg at 03/31/20 1818    medicated lip ointment (BLISTEX), , Topical, PRN, Bakari Rico RN    0.9 % sodium chloride bolus, 20 mL, Intravenous, Once, MD Parviz Montelongo  [START ON 4/2/2020] darbepoetin mateo-polysorbate (ARANESP) injection 200 mcg, 200 mcg, Intravenous, Weekly - Thursday, Shahab Cheng, DO    chlorhexidine (PERIDEX) 0.12 % solution 15 mL, 15 mL, Mouth/Throat, BID, Jose Castano MD, 15 mL at 04/01/20 0914    pantoprazole (PROTONIX) injection 40 mg, 40 mg, Intravenous, Daily, Jose Castano MD, 40 mg at 04/01/20 6822    Venelex ointment, , Topical, BID, Siri Cavanaugh MD    atorvastatin (LIPITOR) tablet 80 mg, 80 mg, Oral, Nightly, Chandu Witt MD, 80 mg at 03/31/20 2101    bisacodyl (DULCOLAX) EC tablet 5 mg, 5 mg, Oral, Daily PRN, Chandu Witt MD    miconazole (MICOTIN) 2 % powder, ,

## 2020-04-02 LAB
ALBUMIN SERPL-MCNC: 3.4 G/DL (ref 3.4–5)
ANION GAP SERPL CALCULATED.3IONS-SCNC: 12 MMOL/L (ref 3–16)
ANISOCYTOSIS: ABNORMAL
BASOPHILS ABSOLUTE: 0 K/UL (ref 0–0.2)
BASOPHILS RELATIVE PERCENT: 0.4 %
BUN BLDV-MCNC: 20 MG/DL (ref 7–20)
CALCIUM SERPL-MCNC: 10.7 MG/DL (ref 8.3–10.6)
CHLORIDE BLD-SCNC: 101 MMOL/L (ref 99–110)
CO2: 25 MMOL/L (ref 21–32)
CREAT SERPL-MCNC: 1.6 MG/DL (ref 0.6–1.2)
EOSINOPHILS ABSOLUTE: 0.2 K/UL (ref 0–0.6)
EOSINOPHILS RELATIVE PERCENT: 2.5 %
GFR AFRICAN AMERICAN: 37
GFR NON-AFRICAN AMERICAN: 31
GLUCOSE BLD-MCNC: 144 MG/DL (ref 70–99)
GLUCOSE BLD-MCNC: 158 MG/DL (ref 70–99)
GLUCOSE BLD-MCNC: 176 MG/DL (ref 70–99)
GLUCOSE BLD-MCNC: 194 MG/DL (ref 70–99)
GLUCOSE BLD-MCNC: 194 MG/DL (ref 70–99)
GLUCOSE BLD-MCNC: 209 MG/DL (ref 70–99)
GLUCOSE BLD-MCNC: 211 MG/DL (ref 70–99)
HCT VFR BLD CALC: 25.8 % (ref 36–48)
HEMOGLOBIN: 8.2 G/DL (ref 12–16)
HYPOCHROMIA: ABNORMAL
LYMPHOCYTES ABSOLUTE: 1 K/UL (ref 1–5.1)
LYMPHOCYTES RELATIVE PERCENT: 11.1 %
MCH RBC QN AUTO: 28.6 PG (ref 26–34)
MCHC RBC AUTO-ENTMCNC: 31.8 G/DL (ref 31–36)
MCV RBC AUTO: 90.1 FL (ref 80–100)
MONOCYTES ABSOLUTE: 0.7 K/UL (ref 0–1.3)
MONOCYTES RELATIVE PERCENT: 7.5 %
NEUTROPHILS ABSOLUTE: 7 K/UL (ref 1.7–7.7)
NEUTROPHILS RELATIVE PERCENT: 78.5 %
PDW BLD-RTO: 18.9 % (ref 12.4–15.4)
PERFORMED ON: ABNORMAL
PHOSPHORUS: 3.2 MG/DL (ref 2.5–4.9)
PLATELET # BLD: 68 K/UL (ref 135–450)
PLATELET SLIDE REVIEW: ABNORMAL
PMV BLD AUTO: 9 FL (ref 5–10.5)
POTASSIUM SERPL-SCNC: 4 MMOL/L (ref 3.5–5.1)
RBC # BLD: 2.87 M/UL (ref 4–5.2)
SLIDE REVIEW: ABNORMAL
SODIUM BLD-SCNC: 138 MMOL/L (ref 136–145)
WBC # BLD: 8.9 K/UL (ref 4–11)

## 2020-04-02 PROCEDURE — C9113 INJ PANTOPRAZOLE SODIUM, VIA: HCPCS | Performed by: INTERNAL MEDICINE

## 2020-04-02 PROCEDURE — 6370000000 HC RX 637 (ALT 250 FOR IP): Performed by: INTERNAL MEDICINE

## 2020-04-02 PROCEDURE — 36415 COLL VENOUS BLD VENIPUNCTURE: CPT

## 2020-04-02 PROCEDURE — 2500000003 HC RX 250 WO HCPCS: Performed by: STUDENT IN AN ORGANIZED HEALTH CARE EDUCATION/TRAINING PROGRAM

## 2020-04-02 PROCEDURE — 90935 HEMODIALYSIS ONE EVALUATION: CPT

## 2020-04-02 PROCEDURE — 99233 SBSQ HOSP IP/OBS HIGH 50: CPT | Performed by: INTERNAL MEDICINE

## 2020-04-02 PROCEDURE — 6360000002 HC RX W HCPCS: Performed by: INTERNAL MEDICINE

## 2020-04-02 PROCEDURE — 2700000000 HC OXYGEN THERAPY PER DAY

## 2020-04-02 PROCEDURE — 94640 AIRWAY INHALATION TREATMENT: CPT

## 2020-04-02 PROCEDURE — 80069 RENAL FUNCTION PANEL: CPT

## 2020-04-02 PROCEDURE — 6370000000 HC RX 637 (ALT 250 FOR IP): Performed by: STUDENT IN AN ORGANIZED HEALTH CARE EDUCATION/TRAINING PROGRAM

## 2020-04-02 PROCEDURE — 99291 CRITICAL CARE FIRST HOUR: CPT | Performed by: INTERNAL MEDICINE

## 2020-04-02 PROCEDURE — 2580000003 HC RX 258: Performed by: STUDENT IN AN ORGANIZED HEALTH CARE EDUCATION/TRAINING PROGRAM

## 2020-04-02 PROCEDURE — 85025 COMPLETE CBC W/AUTO DIFF WBC: CPT

## 2020-04-02 PROCEDURE — 94770 HC ETCO2 MONITOR DAILY: CPT

## 2020-04-02 PROCEDURE — 2000000000 HC ICU R&B

## 2020-04-02 PROCEDURE — 94003 VENT MGMT INPAT SUBQ DAY: CPT

## 2020-04-02 PROCEDURE — 94761 N-INVAS EAR/PLS OXIMETRY MLT: CPT

## 2020-04-02 RX ORDER — HEPARIN SODIUM 5000 [USP'U]/ML
5000 INJECTION, SOLUTION INTRAVENOUS; SUBCUTANEOUS EVERY 8 HOURS SCHEDULED
Status: DISCONTINUED | OUTPATIENT
Start: 2020-04-02 | End: 2020-04-02

## 2020-04-02 RX ADMIN — DIPHENHYDRAMINE HYDROCHLORIDE 25 MG: 50 INJECTION, SOLUTION INTRAMUSCULAR; INTRAVENOUS at 13:43

## 2020-04-02 RX ADMIN — MICONAZOLE NITRATE: 20 POWDER TOPICAL at 20:14

## 2020-04-02 RX ADMIN — DEXMEDETOMIDINE 0.8 MCG/KG/HR: 100 INJECTION, SOLUTION, CONCENTRATE INTRAVENOUS at 12:26

## 2020-04-02 RX ADMIN — Medication: at 20:14

## 2020-04-02 RX ADMIN — ALBUTEROL SULFATE 2.5 MG: 2.5 SOLUTION RESPIRATORY (INHALATION) at 12:28

## 2020-04-02 RX ADMIN — INSULIN LISPRO 4 UNITS: 100 INJECTION, SOLUTION INTRAVENOUS; SUBCUTANEOUS at 05:06

## 2020-04-02 RX ADMIN — POLYETHYLENE GLYCOL (3350) 17 G: 17 POWDER, FOR SOLUTION ORAL at 20:14

## 2020-04-02 RX ADMIN — PANTOPRAZOLE SODIUM 40 MG: 40 INJECTION, POWDER, FOR SOLUTION INTRAVENOUS at 08:41

## 2020-04-02 RX ADMIN — Medication 10 ML: at 20:14

## 2020-04-02 RX ADMIN — INSULIN LISPRO 2 UNITS: 100 INJECTION, SOLUTION INTRAVENOUS; SUBCUTANEOUS at 17:04

## 2020-04-02 RX ADMIN — CHLORHEXIDINE GLUCONATE 0.12% ORAL RINSE 15 ML: 1.2 LIQUID ORAL at 20:00

## 2020-04-02 RX ADMIN — DARBEPOETIN ALFA 200 MCG: 100 INJECTION, SOLUTION INTRAVENOUS; SUBCUTANEOUS at 13:43

## 2020-04-02 RX ADMIN — ATORVASTATIN CALCIUM 80 MG: 80 TABLET, FILM COATED ORAL at 20:14

## 2020-04-02 RX ADMIN — Medication 10 ML: at 08:41

## 2020-04-02 RX ADMIN — ALBUTEROL SULFATE 2.5 MG: 2.5 SOLUTION RESPIRATORY (INHALATION) at 09:05

## 2020-04-02 RX ADMIN — CHLORHEXIDINE GLUCONATE 0.12% ORAL RINSE 15 ML: 1.2 LIQUID ORAL at 08:22

## 2020-04-02 RX ADMIN — ALBUTEROL SULFATE 2.5 MG: 2.5 SOLUTION RESPIRATORY (INHALATION) at 05:05

## 2020-04-02 RX ADMIN — Medication: at 08:22

## 2020-04-02 RX ADMIN — INSULIN LISPRO 2 UNITS: 100 INJECTION, SOLUTION INTRAVENOUS; SUBCUTANEOUS at 08:24

## 2020-04-02 RX ADMIN — INSULIN LISPRO 4 UNITS: 100 INJECTION, SOLUTION INTRAVENOUS; SUBCUTANEOUS at 00:24

## 2020-04-02 RX ADMIN — DEXMEDETOMIDINE 0.8 MCG/KG/HR: 100 INJECTION, SOLUTION, CONCENTRATE INTRAVENOUS at 17:49

## 2020-04-02 RX ADMIN — ALBUTEROL SULFATE 2.5 MG: 2.5 SOLUTION RESPIRATORY (INHALATION) at 16:02

## 2020-04-02 RX ADMIN — INSULIN LISPRO 2 UNITS: 100 INJECTION, SOLUTION INTRAVENOUS; SUBCUTANEOUS at 20:20

## 2020-04-02 RX ADMIN — ALBUTEROL SULFATE 2.5 MG: 2.5 SOLUTION RESPIRATORY (INHALATION) at 20:00

## 2020-04-02 RX ADMIN — ALBUTEROL SULFATE 2.5 MG: 2.5 SOLUTION RESPIRATORY (INHALATION) at 00:05

## 2020-04-02 RX ADMIN — INSULIN LISPRO 2 UNITS: 100 INJECTION, SOLUTION INTRAVENOUS; SUBCUTANEOUS at 13:46

## 2020-04-02 RX ADMIN — MICONAZOLE NITRATE: 20 POWDER TOPICAL at 08:22

## 2020-04-02 ASSESSMENT — PULMONARY FUNCTION TESTS
PIF_VALUE: 28
PIF_VALUE: 21
PIF_VALUE: 21
PIF_VALUE: 34
PIF_VALUE: 30
PIF_VALUE: 27
PIF_VALUE: 24
PIF_VALUE: 32
PIF_VALUE: 26
PIF_VALUE: 35
PIF_VALUE: 25
PIF_VALUE: 27
PIF_VALUE: 24
PIF_VALUE: 32
PIF_VALUE: 24
PIF_VALUE: 27
PIF_VALUE: 13
PIF_VALUE: 28
PIF_VALUE: 27
PIF_VALUE: 22
PIF_VALUE: 23

## 2020-04-02 ASSESSMENT — PAIN SCALES - GENERAL
PAINLEVEL_OUTOF10: 0

## 2020-04-02 ASSESSMENT — PAIN DESCRIPTION - PAIN TYPE: TYPE: CHRONIC PAIN

## 2020-04-02 NOTE — DISCHARGE SUMMARY
INTERNAL MEDICINE DEPARTMENT AT [address] [place] [address]  DISCHARGE SUMMARY    Patient ID: Briseyda Crum                                             Discharge Date: 4/4/2020   Patient's PCP: Lula Br[facility]                                          Discharge Physician: Sly Long MD  Admit Date: 3/26/2020   Admitting Physician: Mani Long MD     PROBLEMS DURING HOSPITALIZATION:  - SOB/BEAUCHAMP  - Acute hypoxic hypercapnic resp failure requiring intubation, x 2 (COVID negative)  - Sepsis 2/2 PNA  - COPD exac  - ESRD on HD  - Hx of Cdiff  - Hx cardiac arrest  - Code status and hospice discussion      Hospital course        Mrs. Vernell Messina is an 79yo female PMH CAD, Chronic Diastolic CHF, ESRD on HD, COPD, HTN, HLD, coming from HD with SOB. Patient was just discharged 1 day prior to presenting to [facility] ED with complaints of shortness of breath. She was recently admitted (3/11/2020-3/20/2020) for treatment of pyelonephritis and was treated with vancomycin and Merrem. At [facility] ED, labs showed increased leukocytosis of 24K and elevated troponin to 0.51. CT chest showed occlusion of RML bronchus likely due to mucous plugging with associated atelectatic collapse and small to moderate pleural effusion. There was concern for sepsis/lung abscess or post obstructive PNA. On day 2-3 of this admission, patient had acute resp failure requiring intubation and was transferred to the ICU where she was extubated on 3/29 and transferred back to [facility] in stable condition, being treated for PNA w abx and improving steadily. Pt was weaned off supplemental O2 and was getting set up for discharge, however experienced acute resp failure again (during HD) on 3/31/2020, rapid response was called for SOB, hypoxia, tachycardia and decreased responsiveness 5 mins into her scheduled HD session. She decompensated rapidly, was intubated, and transferred to the ICU again. In ICU, pt remained stable on minimal vent support. She did undergo HD again on 4/1/2020 but pt became hypotensive so full 3 L was not removed like plan. On 4/2/2020 pt successful underwent UF but was unable to be extubated because of failed SBT despite minimal vent support and ABG wnl's. After discussion was family, decision was made to terminally extubate patient and discharge to inpatient hospice, which occurred overnight 4/3-4/4. Brief history prior to this admission:               The patient was hospitalized between 03/11/2020 to 03/20/2020 for fever and altered mental status and patient had the ICU stay due to hypoxia. The patient had recent diagnosis of misplaced PEG tube. An episode of pyelonephritis treated with vancomycin and meropenem. After prolonged hospital stay, the patient was discharged on [id number]. At the time when she was discharged, she was doing well. Unfortunately, she re-presented to the [facility] again on 03/21/2020 for worsening of shortness of breath. At [facility] Emergency Room, the patient was found have a leukocytosis and elevated troponin level and because of that, the patient has been transferred back to the [facility] for further evaluation treatment.              This patient has very complex and complicated medical problems in recent past. She has a lot of hospitalizations because of a variety of reasons. 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 She also has history of for C diff colitis.        Physical Exam:  BP (!) 124/49   Pulse 130   Temp 97.7 °F (36.5 °C) (Oral)   Resp 17   Ht 5' 4\" (1.626 m)   Wt 178 lb 9.2 oz (81 kg)   SpO2 (!) 49%   BMI 30.65 kg/m²

## 2020-04-02 NOTE — PROGRESS NOTES
clinically. Left basilar pleuroparenchymal consolidation without change. Cardiomegaly. Lines and tubes without change. Right axillary surgical clips noted. FL MODIFIED BARIUM SWALLOW W VIDEO   Final Result   1. Mild oral dysphagia otherwise no penetration or aspiration. XR CHEST PORTABLE   Final Result      ET tube tip at the thoracic inlet. Bilateral airspace disease and pleural effusions            CT CHEST WO CONTRAST   Final Result      Occlusion of the right middle lobe bronchus with associated collapse of the right middle lobe. Interval increase in bilateral nodular and groundglass opacities. This is nonspecific, however, may reflect infectious or inflammatory etiologies, particularly given the increased from the prior study. Areas of septal thickening are also noted, with    which can be seen in the setting of edema. Moderate bilateral pleural effusions. XR CHEST PORTABLE   Final Result      Dense multifocal consolidation involving the right lower lung and left mid/lower lung. Asymmetric pulmonary edema and multifocal pneumonia are considerations. Top normal heart size without change. Right jugular dialysis catheter in standard position. Right axillary surgical clips noted.               Assessment/Plan:    Active Hospital Problems    Diagnosis    Acute diastolic heart failure (HCC) [I50.31]    S/P TAVR (transcatheter aortic valve replacement) [Z95.2]    Nonrheumatic mitral valve regurgitation [I34.0]    Encounter for observation for suspected exposure to other biological agents ruled out [Z03.818]    ESRD on hemodialysis ([address] [N18.6, Z99.2]    Acute respiratory failure with hypoxia and hypercapnia ([address] [J96.01, J96.02]    Sepsis ([address] [A41.9]    Pneumonia due to organism [J18.9]     Acute hypoxic and hypercapnic resp failure suspect post obstructive pneumonia, pulmonary edema and acute diastolic CHF exacerbation  - patient was intubated and s/p bronchoscopy  - ECHO was down which showed moderate mitral stenosis, but not critical stenosis  - card consulted, pulm consulted, neprhoconsulted  - need HD for more fluid removal    Acute on chronic metabolic encephalopathy likely due to pnuemonia  - finsihed 6 day course of merropenem  - was on vanc stopped after MRSA probe neg  - still intubated and sedated    ESRD on HD  - nephro consulted  - challenge dry weight    DMII  - sliding scale    CAD s/p stent 2016  History of aortic stenosis status post TAVR 2016  - statin    Severe protein calorie malnutrition she has a PEG tube in place and tube feeds in place  - dietitian consult    Stage 2 decub  - wound care     Thrombocytopenia  - monitor for now  - hold heparin    DVT Prophylaxis: SCDs  Diet: DIET TUBE FEED CONTINUOUS/CYCLIC NPO; Renal Formula (Nepro);  Gastrostomy; Continuous; 20; 40; 24  Code Status: Full Code    PT/OT Eval Status: none    Dispo - ICU    Sergo Balesh,

## 2020-04-02 NOTE — FLOWSHEET NOTE
Treatment time: 3  Net Uf: 2000 ml     Pre weight: 82.1 kg   Post weight: 81.0 kg  EDW: 78 kg     Access used: R CVC  Access function: GOOD -350    Medications or blood products given: ARANESP, BENADRYL    Regular outpatient schedule: TTS     Summary of response to treatment: TACHYCARDIC AND LABORED BREATHING WITH TREATMENT. TREATMENT ENDED D/Y HYPOTENSION. RINSED BACK WITH 600 MLS NS.  [facility] NOTIFIED. Copy of dialysis treatment record placed in chart, to be scanned into EMR.     04/02/20 1150 04/02/20 1452   Treatment   Time On 1212  --    Time Off  --  1452   Treatment Goal 2000  --    Observations & Evaluations   Level of Consciousness 1 1   FiO2  35 % 35 %   Vital Signs   BP (!) 150/74 121/65   Temp 97.8 °F (36.6 °C)  --    Pulse 81 94   Resp 16 24   SpO2 99 % 100 %   Weight 181 lb (82.1 kg) 178 lb 9.2 oz (81 kg)   Weight Method Bed scale Bed scale   Percent Weight Change 5.26 -1.34   Treatment Initiation   Dialyze Hours 3 3   Hemodialysis Central Access Internal jugular Right Subclavian   No Placement Date or Time found. Pre-existing: Yes  Access Type: Internal jugular  Orientation: Right  Access Location: Subclavian  Description (optional): TDC   Site Assessment Clean;Dry; Intact  --    Dressing Status Clean;Dry; Intact  --    Post-Hemodialysis Assessment   Post-Treatment Procedures  --  Blood returned;Catheter capped, clamped and heparinized x 2 ports   Machine Disinfection Process  --  Acid/Vinegar Clean;Heat Disinfect; Exterior Machine Disinfection   Rinseback Volume (ml)  --  400 ml   Total Liters Processed (l/min)  --  48.5 l/min   Dialyzer Clearance  --  Lightly streaked   Duration of Treatment (minutes)  --  180 minutes   NET Removed (ml)  --  1628 ml   Tolerated Treatment  --  Fair   Physician Notified?  --  Yes

## 2020-04-02 NOTE — PLAN OF CARE
Problem: Falls - Risk of:  Goal: Absence of physical injury  Description: Absence of physical injury  Outcome: Ongoing  Note: Patient remains free of injury.
Problem: Falls - Risk of:  Goal: Will remain free from falls  Description: Patient will remain free of falls throughout the duration of the shift. Bed locked in lowest position. Non skid socks on. Bed and chair alarm activated. Patient remains intubated/sedated and unable to call out appropriately. Patient rounded on frequently. Room door open at all times. Will continue to monitor and reassess. Outcome: Ongoing     Problem: Restraint Use - Nonviolent/Non-Self-Destructive Behavior:  Goal: Absence of restraint-related injury  Description: Absence of restraint-related injury. Patient remains intubated/sedated. Patient education restraint indications. Restraints sites checked routinely. Passive range of motion performed routinely. Will continue to monitor and reassess. Outcome: Ongoing     Problem: Pain:  Goal: Control of acute pain  Description: Control of acute pain. Patient shakes head from left to right when asked if in pain. Patient nods up and down when asked if comfortable. Patient remains sedated and on vent. Patient repositioned routinely for comfort. CPOT scale used to assess pain. Pain level reassessed routinely. Will continue to monitor and reassess. Outcome: Ongoing     Problem: Skin Integrity:  Goal: Absence of new skin breakdown  Description: Absence of new skin breakdown. Patient has pre existing stage II ulcer on coccyx. Pharmaceutical cream applied as ordered. Patient turned routinely and offloaded. Heels and arms, bony prominences offloaded and elevated off of bed. Patient on special air mattress. Patient checked routinely for incontinence. Lines offloaded from skin. Will continue to monitor and reassess. Outcome: Ongoing     Problem: Nutrition  Goal: Optimal nutrition therapy  Description: Tube feeds are at goal per PEG tube. Patient tolerating well at this time. Will continue to monitor and reassess.     Outcome: Ongoing     Problem: Infection - Ventilator-Associated Pneumonia:  Goal:
Problem: Musculor/Skeletal Functional Status  Intervention: PT Evaluation/treatment  Note: Increase safety and independence with functional mobility.
Pt will increase independence with functional transfers and ADLs.
Ongoing     Problem: Skin Integrity:  Goal: Will show no infection signs and symptoms  Description: Will show no infection signs and symptoms  Outcome: Ongoing  Note: Pt at risk for skin breakdown. See Atilio score. Pt remains on bedrest. Unable to reposition self in bed. Heels elevated off bed. Sacral heart mepilex intact to protect,  site inspected and intact underneath. Will continue to turn and reposition patient every two hours and as needed. Will continue to keep patient clean and dry, applying skin care cream as needed. Pillows used for repositioning q2hs. Will continue to monitor and assess for skin breakdown. Goal: Absence of new skin breakdown  Description: Absence of new skin breakdown  3/27/2020 1330 by Catia Falk RN  Outcome: Ongoing  3/27/2020 0924 by Mariusz Johnson RN  Outcome: Ongoing  Note: Excoriation to coccyx buttocks perivaginal & inner thighs as well as abd fold & bilateral groin, wound consult ordered, pt turned & repositioned q 2 hours, sacral heart maintained. Problem: Nutrition  Goal: Optimal nutrition therapy  3/27/2020 1330 by Catia Falk RN  Outcome: Ongoing  3/27/2020 1145 by Storm Garcia RD, LD  Outcome: Ongoing     Problem: Infection - Ventilator-Associated Pneumonia:  Goal: Prevent a ventilator associated event (VAE)  Description: Prevent a ventilator associated event (VAE)  Outcome: Ongoing  Note: No signs of infection at this time. HOB is >30 degrees. Mouth care is completed Q4H by the RT. Pt will be suction PRN. Will cont to monitor.     Goal: Absence of pulmonary infection  Description: Absence of pulmonary infection  Outcome: Ongoing

## 2020-04-02 NOTE — PROGRESS NOTES
liters)   ETCO2: 33 cmH2O   SPO2: 95 %   If on sedation, amount and type     [x]   RR is less than 35, RSBI is less than 100, patient's vitals signs are stable, and patient is in no apparent distress; therefore, patient is being left on SBT for up to 1 hour. []   RR is greater than 35, RSBI is greater than 100, VS's are unstable, or patient is in distress; therefore, patient is being placed back on previous settings. SBT - Concluded at  0912. Weaning Parameters/VS's at conclusion of SBT:   VE: 10.8 L   RR: 36 b/m   VT: 285 mL (average VT = VE/RR)   RSBI: 124 (RR/VT in liters)   ETCO2: 30 cmH2O   SPO2: 88 %    If on sedation, amount and type     []   Patient tolerated SBT for full 60 minutes with acceptable weaning parameters and vital signs and showed no signs or distress. []   Patient tolerated SBT for full 60 minutes, but had unacceptable weaning parameters or vital signs, and/or signs of distress. [x]   Patient was unable to tolerate SBT for 60 minutes and was placed back on previous settings. COMMENTS:  Dr. Mary Carney at bedside.

## 2020-04-02 NOTE — PROGRESS NOTES
nonspecific, however, may reflect infectious or inflammatory etiologies, particularly given the increased from the prior study. Areas of septal thickening are also noted, with    which can be seen in the setting of edema. Moderate bilateral pleural effusions. XR CHEST PORTABLE   Final Result      Dense multifocal consolidation involving the right lower lung and left mid/lower lung. Asymmetric pulmonary edema and multifocal pneumonia are considerations. Top normal heart size without change. Right jugular dialysis catheter in standard position. Right axillary surgical clips noted. Assessment/Plan:   Maureen Arms a 78 y. o. female with PMH of ESRD on HD, CAD, severe malnutrition, chronic dCHF who was admitted with sepsis 2/2 PNA.        Acute hypoxic/hypercapnic respiratory failure 2/2 RML collapse - mucus plugs, s/p bronch 3/28. Resolving.     - Intubated while getting dialysis. - CXR: Worsening pleuroparenchymal consolidation in the right lower lung. Perihilar interstitial prominence suggesting moderate pulmonary edema. Correlate clinically. Left basilar pleuroparenchymal consolidation without change. Cardiomegaly. Lines and tubes without change. Right axillary surgical clips noted.   - Vent Set: AC/PRVC, Vt 450, RR 12, FiO2 35%, PEEP 5  - Trop 0.33  - ABG  - Blood cults (3/31/2020): NGTD  - urine cultures: ?     AMS 2/2 sepsis 2/2 PNA vs pulm abscess (prior imaging, obtaining images), cefepime encephalopathy also on DDx  - d/c merrem, AMS improved significantly after stopping cefepime  - COVID-19 negative  - Lact 2.12 -> 2.4       Thrombocytopenia  - in setting of sepsis, improving     AoC anemia in setting of ESRD, stable, no acute loss  - Hb stable around baseline of 8     ESRD on HD  - HD per nephro Dr Satinder Iniguez     Chronic dHF (G1DD on 2016 ECHO, not mentioned in 2019 ECHO)  - stable, not in exac  - BNP 49697     IDDM2  - Lantus 15 u nightly  - MDSSI  - accuchecks  - hypoglycemic protocol     CAD s/p cath wout PCI 10/7/2016  - cont statin     Severe malnutrition POA  - TFs dosing per alexandra     Stage 2 decub  - wound care     Hx C diff colitis    Code Status: Full  FEN: Tube feeds  PPX: Heparin SubQ  DISPO: Floor    Antoine Brambila MD, PGY-1  04/02/20  7:48 AM    Patient seen, examined and discussed with the resident and I agree with the assessment and plan. Remains Intubated and sedated. Switched to PSV this morning and she looked good initially. Didn't get as much out with dialysis as desired yesterday because of hypotension and tachycardia. Plan was to leave her on PSV through dialysis today and if she doesn't have any issues will proceed with extubation. Stop meropenem. After rounding on patient she became anxious appearing and tachypneic and was pulling low tidal volumes. This indicated a failed wean and we put her back on full support. Critical care time spent reviewing labs/films, examining patient, collaborating with other physicians but excluding procedures for life threatening organ failure is 40 minutes.       Kunal Viera MD

## 2020-04-02 NOTE — PROGRESS NOTES
History from [employer]. Patient is intubated. CC/HPI: 78 y.o. woman who was admitted 5 days ago, 1 day after discharge from [facility]. She was admitted for sob from dialysis. She was also obtunded. She was admitted for 10 days or so for treatment of PEG malfunction and pyelo earlier in March. Completed abx. Discharged. Then admitted to a hospital again on 3/21 for worsening sob but was unltimately transsferred to [facility]. CT showed RML bronchus obstruction and concern for pneumonia. There was concern for sepsis, and she has been treated with abx. At that time, she was d/c home. She presented to [facility] 1 day after d/c from [facility] with sob at dialysis. Has been treated at [facility] for respiratory failure; intubated on admission. She was extubated but today in dialysis she became hypoxic, diaphoretic, and required intubation. Now in ICU.    HPI/Update: Still intubated. Gets hypotensive on dialysis. PE:  Blood pressure (!) 145/73, pulse 85, temperature 97.6 °F (36.4 °C), temperature source Oral, resp. rate 18, height 5' 4\" (1.626 m), weight 171 lb 15.3 oz (78 kg), SpO2 100 %. General (appearance): Well devel. Intubated. Eyes: Anicteric  Neck: Supple. JVD difficult to assess  Ears/Nose/Mouth/Thorat: No cyanosis  CV: RRR. Respiratory:  + BS on ventilator. GI: Abd s/nt/nd. No peritoneal signs  Skin: Warm, dry. No rashes  Neuro/Psych: Alert and oriented x 3. Appropriate behavior  Ext:  No c/c. + edema    ECG: S tach with fusion complex and PAC. Poss LAE. Nonspecfic IVCD. Cannot r/o anterior infarct.     Lab Results   Component Value Date    WBC 8.9 04/02/2020    HGB 8.2 (L) 04/02/2020    HCT 25.8 (L) 04/02/2020    MCV 90.1 04/02/2020    PLT 68 (L) 04/02/2020     Lab Results   Component Value Date     04/02/2020    K 4.0 04/02/2020     04/02/2020    CO2 25 04/02/2020    BUN 20 04/02/2020    CREATININE 1.6 (H) 04/02/2020    GLUCOSE 194 (H) 04/02/2020    CALCIUM 10.7 (H) 04/02/2020    PROT

## 2020-04-02 NOTE — PROGRESS NOTES
[facility]    [facility]              [phone]                       Plan :     · I will give her Benadryl before dialysis and use a different dialyzer to prevent any dialyzer reaction. · COVID was ruled out. · Continue Aranesp q. weekly for anemia. · Dialysis arranged for today. · Attempt extubation after dialysis. Assessment :     1. ESRD:  Will plan HD per schedule Tuesday, Saturday  and Thursday. She goes to [facility] for hemodialysis. Access: Hemodialysis catheter  Daily weights  Fluid restriction: 1 L  Nephrocap 1 tab PO daily    2. Anemia:  Erythropoetin dose: Continue Aranesp with dialysis to keep hemoglobin between 10-12. Transfuse hemoglobin is less than 6.      3.  Osteodystrophy:  Phosphate Binder: stop Sensipar daily. 4.  Shortness of breath: She does have leukocytosis but also of a collapsed lung. She is on antibiotics. Please monitor vancomycin level. 5.  Hypertension: Off BP medicines          [facility] would like to thank Mickie Thomas DO   for opportunity to serve this patient      Please call with questions at-   24 Hrs Answering service [phone] or  7 am- 5 pm via Perfect serve or cell phone  Lexi Martínez          CC/reason for consult :     ESRD on hemodialysis     HPI :     Cheryl Duckworth is a 78 y.o. female presented to   the hospital on 3/26/2020 with shortness of breath. she has past medical history significant for chronic respiratory issues, status post tracheostomy, bilateral carotid artery stenosis, congestive heart failure, history of aortic stenosis status post TAVR, ESRD on hemodialysis which was recently started, history of complete heart block requiring pacemaker and history of cardiac arrest.    She was recently in the hospital for fever and altered mental status and stayed in ICU due to hypoxia. She was treated with vancomycin and meropenem for pyelonephritis. She was discharged a week ago.   She infusion, 0.2 mcg/kg/hr, Intravenous, Continuous  albuterol (PROVENTIL) nebulizer solution 2.5 mg, 2.5 mg, Nebulization, Q4H  hydrALAZINE (APRESOLINE) injection 5 mg, 5 mg, Intravenous, Q6H PRN  medicated lip ointment (BLISTEX), , Topical, PRN  0.9 % sodium chloride bolus, 20 mL, Intravenous, Once  darbepoetin mateo-polysorbate (ARANESP) injection 200 mcg, 200 mcg, Intravenous, Weekly - Thursday  chlorhexidine (PERIDEX) 0.12 % solution 15 mL, 15 mL, Mouth/Throat, BID  pantoprazole (PROTONIX) injection 40 mg, 40 mg, Intravenous, Daily  Venelex ointment, , Topical, BID  atorvastatin (LIPITOR) tablet 80 mg, 80 mg, Oral, Nightly  bisacodyl (DULCOLAX) EC tablet 5 mg, 5 mg, Oral, Daily PRN  miconazole (MICOTIN) 2 % powder, , Topical, BID  polyethylene glycol (GLYCOLAX) packet 17 g, 17 g, Oral, Nightly  sodium chloride flush 0.9 % injection 10 mL, 10 mL, Intravenous, 2 times per day  sodium chloride flush 0.9 % injection 10 mL, 10 mL, Intravenous, PRN  acetaminophen (TYLENOL) tablet 650 mg, 650 mg, Oral, Q6H PRN **OR** acetaminophen (TYLENOL) suppository 650 mg, 650 mg, Rectal, Q6H PRN  promethazine (PHENERGAN) tablet 12.5 mg, 12.5 mg, Oral, Q6H PRN **OR** ondansetron (ZOFRAN) injection 4 mg, 4 mg, Intravenous, Q6H PRN  glucose (GLUTOSE) 40 % oral gel 15 g, 15 g, Oral, PRN  dextrose 50 % IV solution, 12.5 g, Intravenous, PRN  glucagon (rDNA) injection 1 mg, 1 mg, Intramuscular, PRN  dextrose 5 % solution, 100 mL/hr, Intravenous, PRN  heparin (porcine) injection 4,500 Units, 4,500 Units, Intracatheter, PRN       Vitals :     Vitals:    04/02/20 0906   BP:    Pulse: 85   Resp: 18   Temp:    SpO2: 100%       I & O :       Intake/Output Summary (Last 24 hours) at 4/2/2020 0915  Last data filed at 4/2/2020 0600  Gross per 24 hour   Intake 1753 ml   Output 2425 ml   Net -672 ml        Physical Examination :     General appearance: Intubated.     HEENT: Lips- normal, teeth- ok , oral mucosa- moist  Neck : Mass- no, appears

## 2020-04-03 VITALS
DIASTOLIC BLOOD PRESSURE: 49 MMHG | HEIGHT: 64 IN | OXYGEN SATURATION: 49 % | HEART RATE: 130 BPM | RESPIRATION RATE: 17 BRPM | BODY MASS INDEX: 30.49 KG/M2 | SYSTOLIC BLOOD PRESSURE: 124 MMHG | WEIGHT: 178.57 LBS | TEMPERATURE: 97.7 F

## 2020-04-03 LAB
ALBUMIN SERPL-MCNC: 3.4 G/DL (ref 3.4–5)
ANION GAP SERPL CALCULATED.3IONS-SCNC: 11 MMOL/L (ref 3–16)
BASE EXCESS ARTERIAL: 2.8 MMOL/L (ref -3–3)
BASOPHILS ABSOLUTE: 0 K/UL (ref 0–0.2)
BASOPHILS RELATIVE PERCENT: 0.3 %
BUN BLDV-MCNC: 17 MG/DL (ref 7–20)
CALCIUM SERPL-MCNC: 10.6 MG/DL (ref 8.3–10.6)
CARBOXYHEMOGLOBIN ARTERIAL: 3.7 % (ref 0–1.5)
CHLORIDE BLD-SCNC: 98 MMOL/L (ref 99–110)
CO2: 25 MMOL/L (ref 21–32)
CREAT SERPL-MCNC: 1.3 MG/DL (ref 0.6–1.2)
EOSINOPHILS ABSOLUTE: 0.4 K/UL (ref 0–0.6)
EOSINOPHILS RELATIVE PERCENT: 5 %
GFR AFRICAN AMERICAN: 47
GFR NON-AFRICAN AMERICAN: 39
GLUCOSE BLD-MCNC: 135 MG/DL (ref 70–99)
GLUCOSE BLD-MCNC: 150 MG/DL (ref 70–99)
GLUCOSE BLD-MCNC: 178 MG/DL (ref 70–99)
GLUCOSE BLD-MCNC: 220 MG/DL (ref 70–99)
GLUCOSE BLD-MCNC: 270 MG/DL (ref 70–99)
HCO3 ARTERIAL: 27 MMOL/L (ref 21–29)
HCT VFR BLD CALC: 27.9 % (ref 36–48)
HEMOGLOBIN, ART, EXTENDED: 8.2 G/DL
HEMOGLOBIN: 8.9 G/DL (ref 12–16)
LYMPHOCYTES ABSOLUTE: 0.7 K/UL (ref 1–5.1)
LYMPHOCYTES RELATIVE PERCENT: 8.4 %
MCH RBC QN AUTO: 28.7 PG (ref 26–34)
MCHC RBC AUTO-ENTMCNC: 31.9 G/DL (ref 31–36)
MCV RBC AUTO: 90.1 FL (ref 80–100)
METHEMOGLOBIN ARTERIAL: 0.1 % (ref 0–1.4)
MONOCYTES ABSOLUTE: 0.6 K/UL (ref 0–1.3)
MONOCYTES RELATIVE PERCENT: 7 %
NEUTROPHILS ABSOLUTE: 7 K/UL (ref 1.7–7.7)
NEUTROPHILS RELATIVE PERCENT: 79.3 %
O2 SAT, ARTERIAL: 96 % (ref 93–100)
PCO2 ARTERIAL: 44.7 MMHG (ref 35–45)
PDW BLD-RTO: 18.3 % (ref 12.4–15.4)
PERFORMED ON: ABNORMAL
PH ARTERIAL: 7.4 (ref 7.35–7.45)
PHOSPHORUS: 2.1 MG/DL (ref 2.5–4.9)
PLATELET # BLD: 71 K/UL (ref 135–450)
PMV BLD AUTO: 9.4 FL (ref 5–10.5)
PO2 ARTERIAL: 72 MMHG (ref 75–108)
POTASSIUM SERPL-SCNC: 3.7 MMOL/L (ref 3.5–5.1)
RBC # BLD: 3.1 M/UL (ref 4–5.2)
SODIUM BLD-SCNC: 134 MMOL/L (ref 136–145)
TCO2 ARTERIAL: 29 MMOL/L
WBC # BLD: 8.8 K/UL (ref 4–11)

## 2020-04-03 PROCEDURE — 99291 CRITICAL CARE FIRST HOUR: CPT | Performed by: INTERNAL MEDICINE

## 2020-04-03 PROCEDURE — 2580000003 HC RX 258: Performed by: STUDENT IN AN ORGANIZED HEALTH CARE EDUCATION/TRAINING PROGRAM

## 2020-04-03 PROCEDURE — 6360000002 HC RX W HCPCS: Performed by: INTERNAL MEDICINE

## 2020-04-03 PROCEDURE — 94640 AIRWAY INHALATION TREATMENT: CPT

## 2020-04-03 PROCEDURE — C9113 INJ PANTOPRAZOLE SODIUM, VIA: HCPCS | Performed by: INTERNAL MEDICINE

## 2020-04-03 PROCEDURE — 94750 HC PULMONARY COMPLIANCE STUDY: CPT

## 2020-04-03 PROCEDURE — 2500000003 HC RX 250 WO HCPCS: Performed by: STUDENT IN AN ORGANIZED HEALTH CARE EDUCATION/TRAINING PROGRAM

## 2020-04-03 PROCEDURE — 6360000002 HC RX W HCPCS: Performed by: STUDENT IN AN ORGANIZED HEALTH CARE EDUCATION/TRAINING PROGRAM

## 2020-04-03 PROCEDURE — 94003 VENT MGMT INPAT SUBQ DAY: CPT

## 2020-04-03 PROCEDURE — 6370000000 HC RX 637 (ALT 250 FOR IP): Performed by: STUDENT IN AN ORGANIZED HEALTH CARE EDUCATION/TRAINING PROGRAM

## 2020-04-03 PROCEDURE — 2700000000 HC OXYGEN THERAPY PER DAY

## 2020-04-03 PROCEDURE — 94799 UNLISTED PULMONARY SVC/PX: CPT

## 2020-04-03 PROCEDURE — 6370000000 HC RX 637 (ALT 250 FOR IP): Performed by: INTERNAL MEDICINE

## 2020-04-03 PROCEDURE — 99232 SBSQ HOSP IP/OBS MODERATE 35: CPT | Performed by: NURSE PRACTITIONER

## 2020-04-03 PROCEDURE — 94770 HC ETCO2 MONITOR DAILY: CPT

## 2020-04-03 PROCEDURE — 80069 RENAL FUNCTION PANEL: CPT

## 2020-04-03 PROCEDURE — 36415 COLL VENOUS BLD VENIPUNCTURE: CPT

## 2020-04-03 PROCEDURE — 36600 WITHDRAWAL OF ARTERIAL BLOOD: CPT

## 2020-04-03 PROCEDURE — 85025 COMPLETE CBC W/AUTO DIFF WBC: CPT

## 2020-04-03 PROCEDURE — 94761 N-INVAS EAR/PLS OXIMETRY MLT: CPT

## 2020-04-03 PROCEDURE — 82803 BLOOD GASES ANY COMBINATION: CPT

## 2020-04-03 RX ORDER — LEVALBUTEROL INHALATION SOLUTION 0.63 MG/3ML
0.63 SOLUTION RESPIRATORY (INHALATION) EVERY 6 HOURS PRN
Status: DISCONTINUED | OUTPATIENT
Start: 2020-04-03 | End: 2020-04-03 | Stop reason: HOSPADM

## 2020-04-03 RX ORDER — ALBUTEROL SULFATE 2.5 MG/3ML
2.5 SOLUTION RESPIRATORY (INHALATION) EVERY 4 HOURS
Status: CANCELLED | OUTPATIENT
Start: 2020-04-03

## 2020-04-03 RX ORDER — LORAZEPAM 2 MG/ML
1 INJECTION INTRAMUSCULAR EVERY 6 HOURS PRN
Status: DISCONTINUED | OUTPATIENT
Start: 2020-04-03 | End: 2020-04-03 | Stop reason: HOSPADM

## 2020-04-03 RX ORDER — LORAZEPAM 2 MG/ML
1 CONCENTRATE ORAL
Qty: 10 ML | Refills: 0 | Status: SHIPPED | OUTPATIENT
Start: 2020-04-03 | End: 2020-04-17

## 2020-04-03 RX ORDER — ACETAMINOPHEN 325 MG/1
650 TABLET ORAL EVERY 6 HOURS PRN
Status: CANCELLED | OUTPATIENT
Start: 2020-04-03

## 2020-04-03 RX ORDER — DIPHENHYDRAMINE HYDROCHLORIDE 50 MG/ML
25 INJECTION INTRAMUSCULAR; INTRAVENOUS
Status: CANCELLED | OUTPATIENT
Start: 2020-04-04

## 2020-04-03 RX ORDER — SODIUM CHLORIDE FOR INHALATION 0.9 %
3 VIAL, NEBULIZER (ML) INHALATION EVERY 4 HOURS PRN
Status: DISCONTINUED | OUTPATIENT
Start: 2020-04-03 | End: 2020-04-03 | Stop reason: HOSPADM

## 2020-04-03 RX ORDER — MORPHINE SULFATE 100 MG/5ML
5 SOLUTION ORAL
Status: DISCONTINUED | OUTPATIENT
Start: 2020-04-03 | End: 2020-04-03 | Stop reason: HOSPADM

## 2020-04-03 RX ORDER — SCOLOPAMINE TRANSDERMAL SYSTEM 1 MG/1
1 PATCH, EXTENDED RELEASE TRANSDERMAL
Status: CANCELLED | OUTPATIENT
Start: 2020-04-03

## 2020-04-03 RX ORDER — ACETAMINOPHEN 650 MG/1
650 SUPPOSITORY RECTAL EVERY 6 HOURS PRN
Status: CANCELLED | OUTPATIENT
Start: 2020-04-03

## 2020-04-03 RX ORDER — MORPHINE SULFATE 2 MG/ML
1 INJECTION, SOLUTION INTRAMUSCULAR; INTRAVENOUS
Status: CANCELLED | OUTPATIENT
Start: 2020-04-03

## 2020-04-03 RX ORDER — SCOLOPAMINE TRANSDERMAL SYSTEM 1 MG/1
1 PATCH, EXTENDED RELEASE TRANSDERMAL
Status: DISCONTINUED | OUTPATIENT
Start: 2020-04-03 | End: 2020-04-03 | Stop reason: HOSPADM

## 2020-04-03 RX ORDER — DIPHENHYDRAMINE HYDROCHLORIDE 50 MG/ML
25 INJECTION INTRAMUSCULAR; INTRAVENOUS EVERY 6 HOURS PRN
Status: CANCELLED | OUTPATIENT
Start: 2020-04-03

## 2020-04-03 RX ORDER — ONDANSETRON 4 MG/1
4 TABLET, ORALLY DISINTEGRATING ORAL EVERY 6 HOURS PRN
Qty: 20 TABLET | Refills: 0 | Status: SHIPPED | OUTPATIENT
Start: 2020-04-03

## 2020-04-03 RX ORDER — LORAZEPAM 2 MG/ML
1 INJECTION INTRAMUSCULAR EVERY 6 HOURS PRN
Status: CANCELLED | OUTPATIENT
Start: 2020-04-03

## 2020-04-03 RX ORDER — MORPHINE SULFATE 100 MG/5ML
5 SOLUTION ORAL
Qty: 10 ML | Refills: 0 | Status: SHIPPED | OUTPATIENT
Start: 2020-04-03 | End: 2020-04-06

## 2020-04-03 RX ORDER — ONDANSETRON 4 MG/1
4 TABLET, ORALLY DISINTEGRATING ORAL EVERY 6 HOURS PRN
Status: DISCONTINUED | OUTPATIENT
Start: 2020-04-03 | End: 2020-04-03 | Stop reason: HOSPADM

## 2020-04-03 RX ORDER — MORPHINE SULFATE 2 MG/ML
1 INJECTION, SOLUTION INTRAMUSCULAR; INTRAVENOUS
Status: DISCONTINUED | OUTPATIENT
Start: 2020-04-03 | End: 2020-04-03 | Stop reason: HOSPADM

## 2020-04-03 RX ORDER — ONDANSETRON 2 MG/ML
4 INJECTION INTRAMUSCULAR; INTRAVENOUS EVERY 6 HOURS PRN
Status: CANCELLED | OUTPATIENT
Start: 2020-04-03

## 2020-04-03 RX ORDER — LORAZEPAM 2 MG/ML
1 CONCENTRATE ORAL
Status: DISCONTINUED | OUTPATIENT
Start: 2020-04-03 | End: 2020-04-03 | Stop reason: HOSPADM

## 2020-04-03 RX ADMIN — MORPHINE SULFATE 1 MG: 2 INJECTION, SOLUTION INTRAMUSCULAR; INTRAVENOUS at 15:38

## 2020-04-03 RX ADMIN — ALBUTEROL SULFATE 2.5 MG: 2.5 SOLUTION RESPIRATORY (INHALATION) at 00:25

## 2020-04-03 RX ADMIN — INSULIN LISPRO 2 UNITS: 100 INJECTION, SOLUTION INTRAVENOUS; SUBCUTANEOUS at 00:19

## 2020-04-03 RX ADMIN — Medication: at 08:44

## 2020-04-03 RX ADMIN — ALBUTEROL SULFATE 2.5 MG: 2.5 SOLUTION RESPIRATORY (INHALATION) at 08:18

## 2020-04-03 RX ADMIN — CHLORHEXIDINE GLUCONATE 0.12% ORAL RINSE 15 ML: 1.2 LIQUID ORAL at 10:19

## 2020-04-03 RX ADMIN — ALBUTEROL SULFATE 2.5 MG: 2.5 SOLUTION RESPIRATORY (INHALATION) at 04:21

## 2020-04-03 RX ADMIN — HYDROMORPHONE HYDROCHLORIDE 0.5 MG: 1 INJECTION, SOLUTION INTRAMUSCULAR; INTRAVENOUS; SUBCUTANEOUS at 16:05

## 2020-04-03 RX ADMIN — DEXMEDETOMIDINE 0.8 MCG/KG/HR: 100 INJECTION, SOLUTION, CONCENTRATE INTRAVENOUS at 01:37

## 2020-04-03 RX ADMIN — DEXMEDETOMIDINE 1 MCG/KG/HR: 100 INJECTION, SOLUTION, CONCENTRATE INTRAVENOUS at 06:27

## 2020-04-03 RX ADMIN — ISODIUM CHLORIDE 3 ML: 0.03 SOLUTION RESPIRATORY (INHALATION) at 12:09

## 2020-04-03 RX ADMIN — MORPHINE SULFATE 1 MG: 2 INJECTION, SOLUTION INTRAMUSCULAR; INTRAVENOUS at 14:10

## 2020-04-03 RX ADMIN — RACEPINEPHRINE HYDROCHLORIDE 11.25 MG: 11.25 SOLUTION RESPIRATORY (INHALATION) at 12:09

## 2020-04-03 RX ADMIN — LEVALBUTEROL HYDROCHLORIDE 0.63 MG: 0.63 SOLUTION RESPIRATORY (INHALATION) at 16:02

## 2020-04-03 RX ADMIN — CHLORHEXIDINE GLUCONATE 0.12% ORAL RINSE 15 ML: 1.2 LIQUID ORAL at 08:43

## 2020-04-03 RX ADMIN — PANTOPRAZOLE SODIUM 40 MG: 40 INJECTION, POWDER, FOR SOLUTION INTRAVENOUS at 08:43

## 2020-04-03 RX ADMIN — INSULIN LISPRO 6 UNITS: 100 INJECTION, SOLUTION INTRAVENOUS; SUBCUTANEOUS at 11:53

## 2020-04-03 RX ADMIN — BENZOCAINE: 200 SPRAY DENTAL; ORAL; PERIODONTAL at 15:39

## 2020-04-03 RX ADMIN — ACETAMINOPHEN 650 MG: 325 TABLET ORAL at 09:02

## 2020-04-03 RX ADMIN — Medication 10 ML: at 08:44

## 2020-04-03 RX ADMIN — LORAZEPAM 1 MG: 2 INJECTION, SOLUTION INTRAMUSCULAR; INTRAVENOUS at 15:54

## 2020-04-03 RX ADMIN — INSULIN LISPRO 4 UNITS: 100 INJECTION, SOLUTION INTRAVENOUS; SUBCUTANEOUS at 08:46

## 2020-04-03 RX ADMIN — MICONAZOLE NITRATE: 20 POWDER TOPICAL at 08:49

## 2020-04-03 RX ADMIN — ALBUTEROL SULFATE 2.5 MG: 2.5 SOLUTION RESPIRATORY (INHALATION) at 11:50

## 2020-04-03 ASSESSMENT — PAIN SCALES - GENERAL
PAINLEVEL_OUTOF10: 0
PAINLEVEL_OUTOF10: 10
PAINLEVEL_OUTOF10: 2
PAINLEVEL_OUTOF10: 9
PAINLEVEL_OUTOF10: 0
PAINLEVEL_OUTOF10: 0

## 2020-04-03 ASSESSMENT — PULMONARY FUNCTION TESTS
PIF_VALUE: 26
PIF_VALUE: 25
PIF_VALUE: 29
PIF_VALUE: 10
PIF_VALUE: 13
PIF_VALUE: 24
PIF_VALUE: 27
PIF_VALUE: 23
PIF_VALUE: 26
PIF_VALUE: 27

## 2020-04-03 NOTE — PROGRESS NOTES
Transfer note - from Medical Floor to ICU      Mrs. Regina Mohr is an 77yo female PMH CAD, Chronic Diastolic CHF, ESRD on HD, COPD, HTN, HLD, coming from HD with SOB.               Patient was just discharged 1 day prior to presenting to [facility] ED with complaints of shortness of breath. She was recently admitted (3/11/2020-3/20/2020) for treatment of pyelonephritis and was treated with vancomycin and Merrem. At [facility] ED, labs showed increased leukocytosis of 24K and elevated troponin to 0.51. CT chest showed occlusion of RML bronchus likely due to mucous plugging with associated atelectatic collapse and small to moderate pleural effusion. There was concern for sepsis/lung abscess or post obstructive PNA. Patient's guardian reportedly requested transfer to [facility] for further evaluation and treatment. Required reintubated twice aafter dialysis sessions in which she was thought to have reaction to dialysate. Extubated this AM. Family decided to proceed with hospice. Vitals:    04/03/20 1400   BP: (!) 104/52   Pulse: 82   Resp: 24   Temp:    SpO2:        Physical Exam  Constitutional:       General: She is not in acute distress. Appearance: She is ill-appearing. She is not toxic-appearing or diaphoretic. Comments: Sedated but following commands. HENT:      Head: Normocephalic and atraumatic. Eyes:      General: No scleral icterus. Right eye: No discharge. Left eye: No discharge. Conjunctiva/sclera: Conjunctivae normal.   Cardiovascular:      Rate and Rhythm: Normal rate and regular rhythm. Heart sounds: Murmur present. No friction rub. No gallop. Pulmonary:      Effort: No respiratory distress. Breath sounds: No stridor. No wheezing. Chest:      Chest wall: No tenderness. Abdominal:      General: Bowel sounds are normal. There is no distension. Palpations: Abdomen is soft. There is no mass. Tenderness: There is no abdominal tenderness.

## 2020-04-03 NOTE — PROGRESS NOTES
[facility]    [facility]              [phone]                       Plan :     · I am using Benadryl before dialysis and adifferent dialyzer to prevent any dialyzer reaction. · COVID was ruled out. · Continue Aranesp q. weekly for anemia. · Dialysis arranged for am.  · She is now extubated     Assessment :     1. ESRD:  Will plan HD per schedule Tuesday, Saturday  and Thursday. She goes to [facility] for hemodialysis. Access: Hemodialysis catheter  Daily weights  Fluid restriction: 1 L  Nephrocap 1 tab PO daily    2. Anemia:  Erythropoetin dose: Continue Aranesp with dialysis to keep hemoglobin between 10-12. Transfuse hemoglobin is less than 6.      3.  Osteodystrophy:  Phosphate Binder: stop Sensipar daily. 4.  Shortness of breath: extubated now. She had a collapse lung      5. Hypertension: Off BP medicines          [facility] would like to thank Vinicio Velasquez DO   for opportunity to serve this patient      Please call with questions at-   24 Hrs Answering service [phone] or  7 am- 5 pm via Perfect serve or cell phone  [address] [address]          CC/reason for consult :     ESRD on hemodialysis     HPI :     Alicia Baires is a 78 y.o. female presented to   the hospital on 3/26/2020 with shortness of breath. she has past medical history significant for chronic respiratory issues, status post tracheostomy, bilateral carotid artery stenosis, congestive heart failure, history of aortic stenosis status post TAVR, ESRD on hemodialysis which was recently started, history of complete heart block requiring pacemaker and history of cardiac arrest.    She was recently in the hospital for fever and altered mental status and stayed in ICU due to hypoxia. She was treated with vancomycin and meropenem for pyelonephritis. She was discharged a week ago. She went for dialysis today and 1 hour into dialysis became extremely short of breath.   She was sent to the extubated  Cardiovascular:  Ausculation- No M/R/G, Edema none  Abdomen: visible mass- no, distention- no, scar- no, tenderness- no                            hepatosplenomegaly-  no  Musculoskeletal:  clubbing no,cyanosis- no , digital ischemia- no                         Skin: rashes- no , ulcers- no, induration- no, tightening - no  Psychiatric: Not obtained  Additional finding: HD catheter     LABS:     Recent Labs     04/01/20 0457 04/02/20 0429 04/03/20 0423   WBC 8.2 8.9 8.8   HGB 10.0* 8.2* 8.9*   HCT 32.3* 25.8* 27.9*   PLT see below 68* 71*     Recent Labs     04/01/20 0457 04/02/20 0429 04/03/20 0423    138 134*   K 4.9 4.0 3.7    101 98*   CO2 20* 25 25   BUN 34* 20 17   CREATININE 2.4* 1.6* 1.3*   GLUCOSE 304* 194* 150*   PHOS 4.7 3.2 2.1*      Nephrology  Shantanu Castañeda [address], [address]  Office: [phone]  Cell: [phone]  Fax: [phone]

## 2020-04-03 NOTE — PROGRESS NOTES
[I.V.:576.5; NG/GT:1398]  Out: 100 [Urine:100]  I/O this shift: In: 18 [I.V.:80; NG/GT:344]  Out: 48 [Urine:50]    General appearance: appears stated age and mild distress  Lungs: auditory wheezing  Deferred full exam to reduce exposure and conserve PPE during Covid pandemic. Viewed pt from the doorway. WBC/Hgb/Hct/Plts:  8.8/8.9/27.9/71 (04/03 0423)           Assessment:     Active Problems:    Sepsis ([address]    Pneumonia due to organism    Encounter for observation for suspected exposure to other biological agents ruled out    ESRD on hemodialysis ([address]    Acute respiratory failure with hypoxia and hypercapnia ([address]    Acute diastolic heart failure (HCC)    S/P TAVR (transcatheter aortic valve replacement)    Nonrheumatic mitral valve regurgitation  Resolved Problems:    * No resolved hospital problems. *    Pt 1140, Family [phone]  Time spent with patient and/or family: 13  Time reviewing records: 5  Time communicating with providers: 10    A total of 30 minutes spent with the patient and family on unit greater than 50% face to face time in counseling regarding palliative care and goals of care for the patient.      Jerson Willis NP  [address]

## 2020-04-03 NOTE — PROGRESS NOTES
Pt began to desat into 80's, tachycardic into 130's and tachypneic on ventilator. Hyperoxygenated and suctioned several times. RT called to bedside. Pt lavaged per RT and suctioned again. Sats back up to 100. Pt now resting comfortably. Will continue to monitor closely.

## 2020-04-03 NOTE — PROGRESS NOTES
ICU Progress Note    Admit Date: 3/26/2020  Day: 8  Vent Day: None  IV Access:Peripheral  IV Fluids:None  Vasopressors:None                Antibiotics: None  Diet: DIET TUBE FEED CONTINUOUS/CYCLIC NPO; Renal Formula (Nepro); Gastrostomy; Continuous; 20; 40; 24    CC:   Chief Complaint   Patient presents with    Shortness of Breath     came from dialysis with asthma exacerbation     Interval history: BP dropped again yesterday during dialysis but still able to remove 2 L of fluid. No plans for HD per nephrology. Pt on Precedex @ 1. Still able to follow commands. SBT this AM w/ goal to extubate.        Vent Settings: AC/PRVC, Vt 450, RR 12, FiO2 35%, PEEP 5    Medications:     Scheduled Meds:   insulin glargine  15 Units Subcutaneous Nightly    insulin lispro  0-12 Units Subcutaneous Q4H    diphenhydrAMINE  25 mg Intravenous Once per day on Tue Thu Sat    albuterol  2.5 mg Nebulization Q4H    sodium chloride  20 mL Intravenous Once    darbepoetin mateo-polysorbate  200 mcg Intravenous Weekly - Thursday    chlorhexidine  15 mL Mouth/Throat BID    pantoprazole  40 mg Intravenous Daily    Venelex   Topical BID    atorvastatin  80 mg Oral Nightly    miconazole   Topical BID    polyethylene glycol  17 g Oral Nightly    sodium chloride flush  10 mL Intravenous 2 times per day     Continuous Infusions:   dexmedetomidine (PRECEDEX) IV infusion 1 mcg/kg/hr (04/03/20 0627)    dextrose       PRN Meds:diphenhydrAMINE, albumin human, perflutren lipid microspheres, hydrALAZINE, medicated lip ointment, bisacodyl, sodium chloride flush, acetaminophen **OR** acetaminophen, promethazine **OR** ondansetron, glucose, dextrose, glucagon (rDNA), dextrose, heparin (porcine)    Objective:   Vitals:   T-max:  Patient Vitals for the past 8 hrs:   BP Temp Temp src Pulse Resp SpO2   04/03/20 0600 -- -- -- 86 17 98 %   04/03/20 0500 (!) 147/71 -- -- 88 15 98 %   04/03/20 0431 -- -- -- 87 15 98 %   04/03/20 0421 -- -- -- -- 17 97 study. Areas of septal thickening are also noted, with    which can be seen in the setting of edema. Moderate bilateral pleural effusions. XR CHEST PORTABLE   Final Result      Dense multifocal consolidation involving the right lower lung and left mid/lower lung. Asymmetric pulmonary edema and multifocal pneumonia are considerations. Top normal heart size without change. Right jugular dialysis catheter in standard position. Right axillary surgical clips noted. Assessment/Plan:   Sade Medel a 78 y. o. female with PMH of ESRD on HD, CAD, severe malnutrition, chronic dCHF who was admitted with sepsis 2/2 PNA.        Acute hypoxic/hypercapnic respiratory failure 2/2 RML collapse - mucus plugs, s/p bronch 3/28. Resolving.     - Intubated while getting dialysis. - CXR: Worsening pleuroparenchymal consolidation in the right lower lung. Perihilar interstitial prominence suggesting moderate pulmonary edema. Correlate clinically. Left basilar pleuroparenchymal consolidation without change. Cardiomegaly. Lines and tubes without change. Right axillary surgical clips noted.   - Vent Set: AC/PRVC, Vt 450, RR 12, FiO2 35%, PEEP 5  - Trop 0.33  - Blood cults (3/31/2020): NGTD  - urine cultures: ?     AMS 2/2 sepsis 2/2 PNA vs pulm abscess (prior imaging, obtaining images), cefepime encephalopathy also on DDx  - d/c merrem, AMS improved significantly after stopping cefepime  - COVID-19 negative  - Lact 2.12 -> 2.4       Thrombocytopenia  - in setting of sepsis, improving     AoC anemia in setting of ESRD, stable, no acute loss  - Hb stable around baseline of 8     ESRD on HD  - HD per nephro Dr Andrew Aguilera     Chronic dHF (G1DD on 2016 ECHO, not mentioned in 2019 ECHO)  - stable, not in exac  - BNP 39090     IDDM2  - Lantus 15 u nightly  - MDSSI  - accuchecks  - hypoglycemic protocol     CAD s/p cath wout PCI 10/7/2016  - cont statin     Severe malnutrition POA  - TFs dosing per

## 2020-04-03 NOTE — PROGRESS NOTES
L   RR: 19 b/m   VT: 426 mL (average VT = VE/RR)   RSBI: 45 (RR/VT in liters)   ETCO2: 34 cmH2O   SPO2: 98 %   If on sedation, amount and type 1 mcg precedex    [x]   RR is less than 35, RSBI is less than 100, patient's vitals signs are stable, and patient is in no apparent distress; therefore, patient is being left on SBT for up to 1 hour. []   RR is greater than 35, RSBI is greater than 100, VS's are unstable, or patient is in distress; therefore, patient is being placed back on previous settings. SBT - Concluded at  [place] [id number]. Weaning Parameters/VS's at conclusion of SBT:   VE: 7.1 L   RR: 16 b/m   VT: 443 mL (average VT = VE/RR)   RSBI: 36 (RR/VT in liters)   ETCO2: 34 cmH2O   SPO2: 98 %    If on sedation, amount and type 1 mcg precedex    [x]   Patient tolerated SBT for full 60 minutes with acceptable weaning parameters and vital signs and showed no signs or distress. []   Patient tolerated SBT for full 60 minutes, but had unacceptable weaning parameters or vital signs, and/or signs of distress. []   Patient was unable to tolerate SBT for 60 minutes and was placed back on previous settings.             COMMENTS:

## 2020-04-03 NOTE — PROGRESS NOTES
Reviewed notes. Patient is DNR cc now. Plan to stop dialysis. Please call us if we can be of further assistance. Ian QUINTERO.  Mandy Li MD, [facility]

## 2020-04-04 LAB
BLOOD CULTURE, ROUTINE: NORMAL
CULTURE, BLOOD 2: NORMAL

## 2023-02-22 NOTE — CONSULTS
CHIEF COMPLAINT    Chief Complaint   Patient presents with   • Urinary Complaint       HPI and ROS  Patient is a 45-year-old female who presents to the emergency sore throat for 3 days and concern for urine infection today.  Patient has had a burning sore throat for 3 days.  No difficulty swallowing, cough, headache, fever.  No aggravating or alleviating factors.  Patient has tried over-the-counter medications to alleviate the pain.  No neck stiffness.  Patient also noticed burning with urination that started today.  Patient recently did have sexual intercourse several days ago.  No abdominal pain, hematuria, urgency, frequency.  Patient into the emergency room tonight because due to the snowstorm she was concerned there would not be an Urgent Care open tomorrow.    Allergies    ALLERGIES:   Allergen Reactions   • Sulfa Antibiotics HIVES       Current Medications   Current Facility-Administered Medications   Medication   • cefdinir (OMNICEF) capsule 300 mg     Current Outpatient Medications   Medication Sig Dispense Refill   • zinc sulfate (ZINCATE) 220 (50 Zn) MG capsule 220 mg daily.     • cefdinir (OMNICEF) 300 MG capsule Take 1 capsule by mouth in the morning and 1 capsule in the evening. Do all this for 7 days. 14 capsule 0   • loratadine (CLARITIN) 10 MG tablet Take 10 mg by mouth daily.     • nortriptyline (PAMELOR) 10 MG capsule TAKE 4 CAPSULES BY MOUTH EVERY NIGHT AT BEDTIME     • SUMAtriptan (IMITREX) 25 MG tablet Take 1 tablet by mouth at onset of migraine. May repeat after 2 hours if needed.  No more than 4 in 24hrs 10 tablet 0   • propranolol (INDERAL) 10 MG tablet Take 1 tablet by mouth 3 times daily. (Patient taking differently: Take 20 mg by mouth daily.) 270 tablet 3   • cetirizine (ZYRTEC) 10 MG tablet Take 10 mg by mouth daily.     • naproxen (NAPROSYN) 250 MG tablet Take 250 mg by mouth 2 times daily (with meals).         Past Medical History    Past Medical History:   Diagnosis Date   • Miriam  disease    • Kidney stone    • Syncope        Surgical History    Past Surgical History:   Procedure Laterality Date   • Kidney stone surgery         Social History    Social History     Tobacco Use   • Smoking status: Former     Packs/day: 0.50     Types: Cigarettes     Quit date: 2018     Years since quittin.7   • Smokeless tobacco: Never   Vaping Use   • Vaping Use: never used   Substance Use Topics   • Alcohol use: Yes     Alcohol/week: 0.8 standard drinks     Types: 1 Standard drinks or equivalent per week     Comment: 5 a year   • Drug use: No       Family History    Family History   Problem Relation Age of Onset   • Cancer, Skin Melanoma Mother    • Cancer Father    • Stroke Maternal Grandmother    • Cancer Maternal Grandmother         Hodgkin's lymphoma   • Other Maternal Grandmother         calcium and potassium problems   • Cancer Maternal Grandfather         throat   • Heart disease Maternal Grandfather    • Hyperlipidemia Maternal Grandfather        PHYSICAL EXAM     ED Triage Vitals [23 0059]   ED Triage Vitals Group      Temp 98.1 °F (36.7 °C)      Heart Rate (!) 115      Resp 20      BP (!) 163/119      SpO2 99 %      EtCO2 mmHg       Height       Weight 208 lb 15.9 oz (94.8 kg)      Weight Scale Used Standing scale      BMI (Calculated)       IBW/kg (Calculated)        I personally reviewed the nursing notes and vital signs.     General: Awake and alert. In no acute distress. Patient appears comfortable  Head:  Normocephalic, without abnormal findings. Atraumatic.  HEENT: Conjunctiva non-injected. Moist mucous membranes. Oropharynx with bilateral mildly enlarged erythematous tonsils without exudate. Oropharynx patent. Uvula midline.  Neck: Supple  Resp: CTAB without wheezes, rhonchi, or rales.  CVS: Tachycardic, regular rhythm without significant murmur, rub or gallop  ABD: Soft, non-distended, non-tender.  Ext:  No edema. No cyanosis.  Skin:  No significant rashes. No jaundice. No  needs  Monitoring: Chewing/Swallowing , Mental Status/Confusion , Pertinent Labs , TF Intake  or TF Tolerance      OBJECTIVE DATA:  · Nutrition-Focused Physical Findings: LBM 4/2; PEG; Generalized trace nonpitting edema   · Wounds Stage II and Pressure Ulcer  coccyx      Past Medical History:   Diagnosis Date    CAD (coronary artery disease)     [facility]congestive [facility] failure[facility]     CKD (chronic kidney disease)     COPD [facility]chronic obstructive pulmonary disease) ([facility])     Hyperlipidemia     Hypertension     Type II or unspecified type diabetes mellitus without mention of complication, not stated as uncontrolled         ANTHROPOMETRICS  Current Height: 5' 4\" (162.6 cm)  Current Weight: 178 lb 9.2 oz (81 kg)  Bed Scale   Admission weight: 171 lb 15.3 oz (78 kg) Estimated  First Actual weight: 179 lb 10.8 oz (81.5kg) Bed Scale   Target Weight per nephrology: 171 lb (78 kg)  Ideal Bodyweight 120 lb (54 lb)  BMI BMI (Calculated): 30.7    Wt Readings from Last 50 Encounters:   03/30/20 177 lb 0.5 oz (80.3 kg)   03/21/20 178 lb 12.8 oz (81.1 kg)     COMPARATIVE STANDARDS  Estimated Total Kcals/Day : 20-22 Current Bodyweight (80 kg) 7598-5902 kcal    Estimated Total Protein (g/day) : 1.3-1.5 Ideal Bodyweight  (54 kg) 70-81 g/day  Estimated Daily Total Fluid (ml/day): 1000 mL fluid restriction per nephrology     Food / Nutrition-Related History  Pre-Admission / Home Diet:  Pre-Admission/Home Diet: Unable to Obtain, Enteral Nutrition   Home Supplements / Herbals:    none noted  Food Restrictions / Cultural Requests:    none noted    Diet Orders / Intake / Nutrition Support  Current diet/supplement order: DIET TUBE FEED CONTINUOUS/CYCLIC NPO; Renal Formula (Nepro);  Gastrostomy; Continuous; 20; 40; 24     NSG Recorded PO:   PO Fluids P.O.: 0 mL  PO Meals PO Meals Eaten (%): 0   PO Intake: NPO  PO Supplement: NPO   PO Supplement Intake: NPO     Tube Feeding Goal: Nepro @ 40 mL/hr provides 960 mL TV, 1728 kcal, 78 grams ulcers. Warm and dry.  Neuro: No facial asymmetry. Strength 5/5 bilateral upper extremities. Strength 5/5 bilateral lower extremities.   Psych: Normal affect. Calm and cooperative. Acting appropriately      Procedures      MDM    Patient with sore throat.  Differential diagnosis includes viral pharyngitis or strep pharyngitis.  I ordered a strep test and a patient 10 mg of p.o. Decadron for comfort.  I also sent away for patient dysuria.  Patient is on her period.  UA has no nitrites and no bacteria, not concerning for infection at this time, but culture will be sent.  Neck is supple not concerning for deep space infection or retropharyngeal abscess.    Patient's strep test is positive.  I will order Omnicef here to DC patient home with Omnicef as it would cover both the strep pharyngitis and UTI.  Patient heart rate and blood pressure are elevated.  Patient is very anxious in the room and admit she suffers from severe anxiety when going to the doctor.  She is visibly anxious and states she had to do some deep breathing before he came in the room.  I believe the abnormal vital signs are secondary to this.  Patient instructed to follow-up with her physician for this.  Return precautions given.  DC.       I personally reviewed any and all labs, xrays, images ordered.   I personally discussed results with the patient and appropriate family members/guardians as needed.       Labs  Results for orders placed or performed during the hospital encounter of 02/22/23   Urinalysis With Microscopy Exam W/O C/S   Result Value    COLOR, URINALYSIS Orange    APPEARANCE, URINALYSIS Cloudy    GLUCOSE, URINALYSIS Negative    BILIRUBIN, URINALYSIS Negative    KETONES, URINALYSIS 40 (A)    SPECIFIC GRAVITY, URINALYSIS 1.017     Comment: Measured by refractometry    OCCULT BLOOD, URINALYSIS Large (A)    PH, URINALYSIS 6.0    PROTEIN, URINALYSIS 30 (A)    UROBILINOGEN, URINALYSIS 0.2    NITRITE, URINALYSIS Negative    LEUKOCYTE ESTERASE,  URINALYSIS Moderate (A)    SQUAMOUS EPITHELIAL, URINALYSIS 6 to 10 (A)    ERYTHROCYTES, URINALYSIS >100 (A)    LEUKOCYTES, URINALYSIS 26 to 100 (A)    BACTERIA, URINALYSIS None Seen    HYALINE CASTS, URINALYSIS None Seen    MUCUS Present   Pregnancy Test, Urine   Result Value    Pregnancy, Urine Negative   Streptococcus group A PCR    Specimen: Throat; Swab   Result Value    STREPTOCOCCUS GROUP A PCR Detected (A)     Comment: This result was obtained by RT-PCR amplification followed by fluorescence detection. This test has been cleared by the U[place]S. Food and Drug Administration for detection of Strep A.         Radiology  No orders to display         Medications   cefdinir (OMNICEF) capsule 300 mg (has no administration in time range)   dexAMETHasone (DECADRON) injection 10 mg (10 mg Oral Given 2/22/23 0129)         Clinical Impression:  ED Diagnoses     Diagnosis Comment Associated Orders       Final diagnoses    Dysuria -- --    Strep pharyngitis -- --               Summary of your Discharge Medications      Take these Medications      Details   cefdinir 300 MG capsule  Commonly known as: OMNICEF   Take 1 capsule by mouth in the morning and 1 capsule in the evening. Do all this for 7 days.            Follow Up:  Lily Cavazos MD  [address] [place] [address] [address] [place] [address]  [phone]    Schedule an appointment as soon as possible for a visit in 2 days  As needed       Vikki Gilliland M.D.             Vikki Gilliland MD  02/22/23 0828
